# Patient Record
Sex: FEMALE | Race: WHITE | NOT HISPANIC OR LATINO | Employment: OTHER | URBAN - NONMETROPOLITAN AREA
[De-identification: names, ages, dates, MRNs, and addresses within clinical notes are randomized per-mention and may not be internally consistent; named-entity substitution may affect disease eponyms.]

---

## 2017-07-24 ENCOUNTER — HOSPITAL ENCOUNTER (EMERGENCY)
Facility: HOSPITAL | Age: 42
Discharge: HOME | End: 2017-07-24
Attending: EMERGENCY MEDICINE | Admitting: EMERGENCY MEDICINE

## 2017-07-24 ENCOUNTER — APPOINTMENT (OUTPATIENT)
Dept: GENERAL RADIOLOGY | Facility: HOSPITAL | Age: 42
End: 2017-07-24

## 2017-07-24 VITALS
SYSTOLIC BLOOD PRESSURE: 144 MMHG | TEMPERATURE: 98.7 F | OXYGEN SATURATION: 97 % | HEIGHT: 63 IN | BODY MASS INDEX: 49.08 KG/M2 | RESPIRATION RATE: 18 BRPM | DIASTOLIC BLOOD PRESSURE: 78 MMHG | WEIGHT: 277 LBS | HEART RATE: 74 BPM
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
## 2017-07-24 DIAGNOSIS — M79.642 PAIN OF LEFT HAND: ICD-10-CM

## 2017-07-24 DIAGNOSIS — M54.2 NECK PAIN: ICD-10-CM

## 2017-07-24 PROCEDURE — 72050 X-RAY EXAM NECK SPINE 4/5VWS: CPT | Performed by: RADIOLOGY

## 2017-07-24 PROCEDURE — 73130 X-RAY EXAM OF HAND: CPT | Performed by: RADIOLOGY

## 2017-07-24 PROCEDURE — 99283 EMERGENCY DEPT VISIT LOW MDM: CPT

## 2017-07-24 PROCEDURE — 25010000002 KETOROLAC TROMETHAMINE PER 15 MG: Performed by: NURSE PRACTITIONER

## 2017-07-24 PROCEDURE — 72040 X-RAY EXAM NECK SPINE 2-3 VW: CPT

## 2017-07-24 PROCEDURE — 73130 X-RAY EXAM OF HAND: CPT

## 2017-07-24 PROCEDURE — 96372 THER/PROPH/DIAG INJ SC/IM: CPT

## 2017-07-24 RX ORDER — CYCLOBENZAPRINE HCL 10 MG
10 TABLET ORAL ONCE
Status: COMPLETED | OUTPATIENT
Start: 2017-07-24 | End: 2017-07-24

## 2017-07-24 RX ORDER — CYCLOBENZAPRINE HCL 10 MG
10 TABLET ORAL 3 TIMES DAILY PRN
Qty: 12 TABLET | Refills: 0 | Status: SHIPPED | OUTPATIENT
Start: 2017-07-24 | End: 2018-09-17

## 2017-07-24 RX ORDER — KETOROLAC TROMETHAMINE 30 MG/ML
60 INJECTION, SOLUTION INTRAMUSCULAR; INTRAVENOUS ONCE
Status: COMPLETED | OUTPATIENT
Start: 2017-07-24 | End: 2017-07-24

## 2017-07-24 RX ADMIN — CYCLOBENZAPRINE HYDROCHLORIDE 10 MG: 10 TABLET, FILM COATED ORAL at 23:18

## 2017-07-24 RX ADMIN — KETOROLAC TROMETHAMINE 60 MG: 60 INJECTION, SOLUTION INTRAMUSCULAR at 23:18
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
## 2017-07-25 NOTE — DISCHARGE INSTRUCTIONS

## 2017-07-28 ENCOUNTER — TRANSCRIBE ORDERS (OUTPATIENT)
Dept: ADMINISTRATIVE | Facility: HOSPITAL | Age: 42
End: 2017-07-28

## 2017-07-28 DIAGNOSIS — M79.642 HAND PAIN, LEFT: Primary | ICD-10-CM

## 2017-07-29 ENCOUNTER — HOSPITAL ENCOUNTER (OUTPATIENT)
Dept: MRI IMAGING | Facility: HOSPITAL | Age: 42
Discharge: HOME | End: 2017-07-29
Admitting: NURSE PRACTITIONER

## 2017-07-29 DIAGNOSIS — M79.642 HAND PAIN, LEFT: ICD-10-CM

## 2017-07-29 PROCEDURE — 73218 MRI UPPER EXTREMITY W/O DYE: CPT | Performed by: RADIOLOGY

## 2017-07-29 PROCEDURE — 73218 MRI UPPER EXTREMITY W/O DYE: CPT

## 2018-03-25 ENCOUNTER — APPOINTMENT (OUTPATIENT)
Dept: GENERAL RADIOLOGY | Facility: HOSPITAL | Age: 43
End: 2018-03-25

## 2018-03-25 ENCOUNTER — HOSPITAL ENCOUNTER (EMERGENCY)
Facility: HOSPITAL | Age: 43
Discharge: HOME | End: 2018-03-25
Attending: EMERGENCY MEDICINE | Admitting: EMERGENCY MEDICINE

## 2018-03-25 VITALS
BODY MASS INDEX: 46.95 KG/M2 | HEART RATE: 98 BPM | HEIGHT: 64 IN | RESPIRATION RATE: 18 BRPM | OXYGEN SATURATION: 97 % | WEIGHT: 275 LBS | DIASTOLIC BLOOD PRESSURE: 92 MMHG | TEMPERATURE: 98 F | SYSTOLIC BLOOD PRESSURE: 134 MMHG

## 2018-03-25 DIAGNOSIS — S60.051A CONTUSION OF RIGHT LITTLE FINGER WITHOUT DAMAGE TO NAIL, INITIAL ENCOUNTER: Primary | ICD-10-CM

## 2018-03-25 PROCEDURE — 99283 EMERGENCY DEPT VISIT LOW MDM: CPT

## 2018-03-25 PROCEDURE — 73140 X-RAY EXAM OF FINGER(S): CPT | Performed by: RADIOLOGY

## 2018-03-25 PROCEDURE — 73140 X-RAY EXAM OF FINGER(S): CPT

## 2018-03-26 ENCOUNTER — EPISODE CHANGES (OUTPATIENT)
Dept: CASE MANAGEMENT | Facility: OTHER | Age: 43
End: 2018-03-26

## 2018-03-26 NOTE — ED NOTES
Patient reports she got her right pinky finger caught between two flower pots, reports she now has pain with movement. Provided ice pack for patient.      Isela Ascencio RN  03/25/18 2041

## 2018-03-26 NOTE — ED PROVIDER NOTES
Subjective     History provided by:  Patient  Upper Extremity Issue   Location:  Finger  Finger location:  R little finger  Injury: yes    Mechanism of injury: crush    Crush injury:     Mechanism:  Unable to specify  Pain details:     Quality:  Aching and throbbing    Radiates to:  Does not radiate    Severity:  Mild    Onset quality:  Gradual    Timing:  Constant    Progression:  Unchanged  Handedness:  Right-handed  Dislocation: no    Foreign body present:  No foreign bodies  Tetanus status:  Up to date  Prior injury to area:  No  Relieved by:  None tried  Worsened by:  Nothing  Ineffective treatments:  None tried  Associated symptoms: no fever        Review of Systems   Constitutional: Negative.  Negative for fever.   HENT: Negative.    Respiratory: Negative.    Cardiovascular: Negative.  Negative for chest pain.   Gastrointestinal: Negative.  Negative for abdominal pain.   Endocrine: Negative.    Genitourinary: Negative.  Negative for dysuria.   Skin: Negative.    Neurological: Negative.    Psychiatric/Behavioral: Negative.    All other systems reviewed and are negative.      History reviewed. No pertinent past medical history.    No Known Allergies    History reviewed. No pertinent surgical history.    History reviewed. No pertinent family history.    Social History     Social History   • Marital status:      Social History Main Topics   • Smoking status: Former Smoker   • Alcohol use No   • Drug use: No   • Sexual activity: Defer     Other Topics Concern   • Not on file           Objective   Physical Exam   Constitutional: She is oriented to person, place, and time. She appears well-developed and well-nourished. No distress.   HENT:   Head: Normocephalic and atraumatic.   Right Ear: External ear normal.   Left Ear: External ear normal.   Nose: Nose normal.   Eyes: Conjunctivae and EOM are normal. Pupils are equal, round, and reactive to light.   Neck: Normal range of motion. Neck supple. No JVD  present. No tracheal deviation present.   Cardiovascular: Normal rate, regular rhythm and normal heart sounds.    No murmur heard.  Pulmonary/Chest: Effort normal and breath sounds normal. No respiratory distress. She has no wheezes.   Abdominal: Soft. Bowel sounds are normal. There is no tenderness.   Musculoskeletal: Normal range of motion. She exhibits no edema or deformity.        Hands:  Neurological: She is alert and oriented to person, place, and time. No cranial nerve deficit.   Skin: Skin is warm and dry. No rash noted. She is not diaphoretic. No erythema. No pallor.   Psychiatric: She has a normal mood and affect. Her behavior is normal. Thought content normal.   Nursing note and vitals reviewed.      Procedures         ED Course  ED Course                  MDM  Number of Diagnoses or Management Options  Contusion of right little finger without damage to nail, initial encounter:      Amount and/or Complexity of Data Reviewed  Tests in the radiology section of CPT®: reviewed        Final diagnoses:   Contusion of right little finger without damage to nail, initial encounter            Rhonda Juárez, APRN  03/25/18 1010

## 2018-07-21 ENCOUNTER — HOSPITAL ENCOUNTER (EMERGENCY)
Facility: HOSPITAL | Age: 43
Discharge: HOME | End: 2018-07-21
Attending: EMERGENCY MEDICINE | Admitting: EMERGENCY MEDICINE

## 2018-07-21 ENCOUNTER — APPOINTMENT (OUTPATIENT)
Dept: GENERAL RADIOLOGY | Facility: HOSPITAL | Age: 43
End: 2018-07-21

## 2018-07-21 VITALS
BODY MASS INDEX: 46.44 KG/M2 | DIASTOLIC BLOOD PRESSURE: 81 MMHG | SYSTOLIC BLOOD PRESSURE: 170 MMHG | RESPIRATION RATE: 16 BRPM | OXYGEN SATURATION: 99 % | HEIGHT: 64 IN | WEIGHT: 272 LBS | HEART RATE: 110 BPM | TEMPERATURE: 97.1 F

## 2018-07-21 DIAGNOSIS — S93.491A HIGH ANKLE SPRAIN OF RIGHT LOWER EXTREMITY, INITIAL ENCOUNTER: Primary | ICD-10-CM

## 2018-07-21 PROCEDURE — 73630 X-RAY EXAM OF FOOT: CPT

## 2018-07-21 PROCEDURE — 73630 X-RAY EXAM OF FOOT: CPT | Performed by: RADIOLOGY

## 2018-07-21 PROCEDURE — 99284 EMERGENCY DEPT VISIT MOD MDM: CPT

## 2018-07-21 RX ORDER — TRAMADOL HYDROCHLORIDE 50 MG/1
50 TABLET ORAL EVERY 4 HOURS PRN
Qty: 12 TABLET | Refills: 0 | Status: SHIPPED | OUTPATIENT
Start: 2018-07-21 | End: 2018-09-17

## 2018-07-21 RX ORDER — HYDROCODONE BITARTRATE AND ACETAMINOPHEN 5; 325 MG/1; MG/1
1 TABLET ORAL ONCE
Status: COMPLETED | OUTPATIENT
Start: 2018-07-21 | End: 2018-07-21

## 2018-07-21 RX ADMIN — HYDROCODONE BITARTRATE AND ACETAMINOPHEN 1 TABLET: 5; 325 TABLET ORAL at 20:53

## 2018-09-17 ENCOUNTER — HOSPITAL ENCOUNTER (OUTPATIENT)
Dept: GENERAL RADIOLOGY | Facility: HOSPITAL | Age: 43
Discharge: HOME | End: 2018-09-17
Attending: ORTHOPAEDIC SURGERY | Admitting: ORTHOPAEDIC SURGERY

## 2018-09-17 ENCOUNTER — OFFICE VISIT (OUTPATIENT)
Dept: ORTHOPEDIC SURGERY | Facility: CLINIC | Age: 43
End: 2018-09-17

## 2018-09-17 VITALS — HEIGHT: 64 IN | WEIGHT: 286 LBS | BODY MASS INDEX: 48.83 KG/M2

## 2018-09-17 DIAGNOSIS — R20.0 FINGER NUMBNESS: ICD-10-CM

## 2018-09-17 DIAGNOSIS — M79.641 HAND PAIN, RIGHT: Primary | ICD-10-CM

## 2018-09-17 DIAGNOSIS — M79.644 PAIN IN FINGER OF RIGHT HAND: Primary | ICD-10-CM

## 2018-09-17 DIAGNOSIS — S69.91XA INJURY, FINGER, RIGHT, INITIAL ENCOUNTER: ICD-10-CM

## 2018-09-17 PROCEDURE — 99203 OFFICE O/P NEW LOW 30 MIN: CPT | Performed by: ORTHOPAEDIC SURGERY

## 2018-09-17 PROCEDURE — 73130 X-RAY EXAM OF HAND: CPT | Performed by: RADIOLOGY

## 2018-09-17 PROCEDURE — 73130 X-RAY EXAM OF HAND: CPT

## 2018-09-17 RX ORDER — GABAPENTIN 800 MG/1
800 TABLET ORAL 3 TIMES DAILY
COMMUNITY

## 2018-09-17 RX ORDER — HYDROCODONE/ACETAMINOPHEN 10MG-325MG
1 TABLET ORAL EVERY 8 HOURS
Status: ON HOLD | COMMUNITY
Start: 2018-08-01 | End: 2019-05-20

## 2018-09-17 RX ORDER — DICLOFENAC SODIUM 75 MG/1
1 TABLET, DELAYED RELEASE ORAL EVERY 12 HOURS
COMMUNITY
Start: 2018-02-28 | End: 2018-09-28 | Stop reason: ALTCHOICE

## 2018-09-17 RX ORDER — OMEPRAZOLE 20 MG/1
40 CAPSULE, DELAYED RELEASE ORAL
COMMUNITY
Start: 2015-02-24 | End: 2022-11-15 | Stop reason: HOSPADM

## 2018-09-17 RX ORDER — TIZANIDINE 2 MG/1
1 TABLET ORAL EVERY 8 HOURS
COMMUNITY
Start: 2018-08-01

## 2018-09-17 NOTE — PROGRESS NOTES
New Patient Visit      Patient: Madelyn Velazquez  YOB: 1975  Date of Encounter: 09/17/2018        Chief Complaint:   Chief Complaint   Patient presents with   • Right Hand - Pain       HPI:   Madelyn Velazquez, 42 y.o. female, referred by Hebert Butt MD presents today with pain in her right hand fifth finger.  She reports that on March 25, 2018 she was at [employer].  She reports a pot somehow fell trapping her fifth finger between that pot and another pot.  She reports continued pain since then.  She localizes pain along the radial border of the fifth finger.  She states she has difficulty bending the finger and straightening her finger.  She denies problems with her fifth finger prior to that injury.  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  She also has chronic pain lateral aspect of her left ankle after undergoing open reduction internal fixation of ankle fracture.    Active Problem List:  Patient Active Problem List   Diagnosis   • Pain in finger of right hand       Past Medical History:  Past Medical History:   Diagnosis Date   • Back pain    • GERD (gastroesophageal reflux disease)    • Osteoarthritis        Past Surgical History:  Past Surgical History:   Procedure Laterality Date   • CARPAL TUNNEL RELEASE Left    • HAND SURGERY Left    • LEG SURGERY Left        Family History:  Family History   Problem Relation Age of Onset   • Osteoarthritis Mother    • Heart disease Mother    • Hypertension Mother    • Osteoarthritis Father    • Hypertension Father    • Osteoarthritis Sister    • Rheum arthritis Sister    • Heart disease Sister    • Hypertension Sister    • Osteoarthritis Brother    • Heart disease Brother    • Hypertension Brother    • Osteoarthritis Maternal Grandmother    • Heart disease Maternal Grandmother    • Hypertension Maternal Grandmother    • Osteoarthritis Maternal Grandfather     • Heart disease Maternal Grandfather    • Hypertension Maternal Grandfather        Social History:  Social History     Social History   • Marital status:      Spouse name: N/A   • Number of children: N/A   • Years of education: N/A     Occupational History   • Not on file.     Social History Main Topics   • Smoking status: Former Smoker   • Smokeless tobacco: Never Used   • Alcohol use No   • Drug use: No   • Sexual activity: Defer     Other Topics Concern   • Not on file     Social History Narrative   • No narrative on file     Patient's Body mass index is 49.09 kg/m². BMI is above normal parameters. Recommendations include: educational material.      Medications:  Current Outpatient Prescriptions   Medication Sig Dispense Refill   • diclofenac (VOLTAREN) 75 MG EC tablet Take 1 tablet by mouth Every 12 (Twelve) Hours.     • gabapentin (NEURONTIN) 800 MG tablet Take 800 mg by mouth 3 (Three) Times a Day.     • HYDROcodone-acetaminophen (NORCO)  MG per tablet Take 1 tablet by mouth Every 8 (Eight) Hours.     • omeprazole (PRILOSEC) 20 MG capsule Take 40 mg by mouth.     • tiZANidine (ZANAFLEX) 2 MG tablet Take 1 tablet by mouth Every 8 (Eight) Hours.       No current facility-administered medications for this visit.        Allergies:  No Known Allergies    Review of Systems:   Review of Systems   Constitutional: Negative.    HENT: Positive for ear discharge, sinus pain and sinus pressure.    Eyes: Negative.    Respiratory: Negative.    Cardiovascular: Positive for leg swelling.   Gastrointestinal: Negative.    Endocrine: Positive for cold intolerance and heat intolerance.   Genitourinary: Negative.    Musculoskeletal: Positive for arthralgias, back pain, joint swelling and myalgias.   Skin: Negative.    Allergic/Immunologic: Negative.    Neurological: Positive for numbness.   Hematological: Negative.    Psychiatric/Behavioral: Negative.        Physical Exam:   Physical Exam  GENERAL: 42 y.o. female,  "alert and oriented X 3 in no acute distress.   Visit Vitals  Ht 162.6 cm (64\")   Wt 130 kg (286 lb)   BMI 49.09 kg/m²     Musculoskeletal: Right hand evaluation reveals no gross deformity of the fifth finger.  It has normal contour it demonstrates full extension with flexion of the PIP joint to 80°.  There is no palpable nodule or defect of the finger.  The tenderness is localized to the radial volar aspect of the proximal finger.  When compared to the left fifth finger there is no significant difference.  She does exhibit decreased sensation along the radial border of the fifth finger.    Radiology/Labs:   Radiographs reviewed right hand fifth finger show no evidence of acute fracture or lesion.      Assessment & Plan:   42 y.o. female with complaints of pain right hand fifth finger with clinical exam demonstrating some decreased sensation along the radial border of the fifth finger.  It may be possible that she has sustained neuropraxia digital nerve radial side fifth finger.  At this point I do not think anything can or should be done for the fifth finger.  She requested injection but I am reluctant to provide given the proximity of her complaints to the digital nerve.  She will follow-up in clinic in 3 months time.  She was offered referral to hand specialist but declined.      ICD-10-CM ICD-9-CM   1. Pain in finger of right hand M79.644 729.5   2. Finger numbness Right Fifth R20.0 782.0   3. Injury, finger, right, initial encounter S69.91XA 959.5               Cc:   Hebert Butt MD          Scribed for Gerson Andres MD by Linda Andres RN.10:59 AM 09/17/2018      "

## 2018-09-28 ENCOUNTER — APPOINTMENT (OUTPATIENT)
Dept: GENERAL RADIOLOGY | Facility: HOSPITAL | Age: 43
End: 2018-09-28

## 2018-09-28 ENCOUNTER — HOSPITAL ENCOUNTER (EMERGENCY)
Facility: HOSPITAL | Age: 43
Discharge: HOME | End: 2018-09-28
Attending: INTERNAL MEDICINE | Admitting: INTERNAL MEDICINE

## 2018-09-28 VITALS
SYSTOLIC BLOOD PRESSURE: 127 MMHG | TEMPERATURE: 97.8 F | BODY MASS INDEX: 47.46 KG/M2 | HEART RATE: 90 BPM | WEIGHT: 278 LBS | RESPIRATION RATE: 18 BRPM | DIASTOLIC BLOOD PRESSURE: 79 MMHG | OXYGEN SATURATION: 98 % | HEIGHT: 64 IN

## 2018-09-28 DIAGNOSIS — M10.9 ACUTE GOUT OF LEFT HAND, UNSPECIFIED CAUSE: Primary | ICD-10-CM

## 2018-09-28 LAB
ALBUMIN SERPL-MCNC: 4.3 G/DL (ref 3.5–5)
ALBUMIN/GLOB SERPL: 1.2 G/DL (ref 1.5–2.5)
ALP SERPL-CCNC: 79 U/L (ref 35–104)
ALT SERPL W P-5'-P-CCNC: 34 U/L (ref 10–36)
ANION GAP SERPL CALCULATED.3IONS-SCNC: 7 MMOL/L (ref 3.6–11.2)
AST SERPL-CCNC: 35 U/L (ref 10–30)
BASOPHILS # BLD AUTO: 0.04 10*3/MM3 (ref 0–0.3)
BASOPHILS NFR BLD AUTO: 0.5 % (ref 0–2)
BILIRUB SERPL-MCNC: 0.4 MG/DL (ref 0.2–1.8)
BUN BLD-MCNC: 9 MG/DL (ref 7–21)
BUN/CREAT SERPL: 10.5 (ref 7–25)
CALCIUM SPEC-SCNC: 9.2 MG/DL (ref 7.7–10)
CHLORIDE SERPL-SCNC: 105 MMOL/L (ref 99–112)
CO2 SERPL-SCNC: 22 MMOL/L (ref 24.3–31.9)
CREAT BLD-MCNC: 0.86 MG/DL (ref 0.43–1.29)
CRP SERPL-MCNC: 4.37 MG/DL (ref 0–0.99)
D-LACTATE SERPL-SCNC: 1.2 MMOL/L (ref 0.5–2)
DEPRECATED RDW RBC AUTO: 44 FL (ref 37–54)
EOSINOPHIL # BLD AUTO: 0.08 10*3/MM3 (ref 0–0.7)
EOSINOPHIL NFR BLD AUTO: 0.9 % (ref 0–5)
ERYTHROCYTE [DISTWIDTH] IN BLOOD BY AUTOMATED COUNT: 13.4 % (ref 11.5–14.5)
ERYTHROCYTE [SEDIMENTATION RATE] IN BLOOD: 23 MM/HR (ref 0–20)
GFR SERPL CREATININE-BSD FRML MDRD: 72 ML/MIN/1.73
GLOBULIN UR ELPH-MCNC: 3.5 GM/DL
GLUCOSE BLD-MCNC: 116 MG/DL (ref 70–110)
HCT VFR BLD AUTO: 45.2 % (ref 37–47)
HGB BLD-MCNC: 14.9 G/DL (ref 12–16)
IMM GRANULOCYTES # BLD: 0.02 10*3/MM3 (ref 0–0.03)
IMM GRANULOCYTES NFR BLD: 0.2 % (ref 0–0.5)
LYMPHOCYTES # BLD AUTO: 1.73 10*3/MM3 (ref 1–3)
LYMPHOCYTES NFR BLD AUTO: 19.5 % (ref 21–51)
MCH RBC QN AUTO: 30.6 PG (ref 27–33)
MCHC RBC AUTO-ENTMCNC: 33 G/DL (ref 33–37)
MCV RBC AUTO: 92.8 FL (ref 80–94)
MONOCYTES # BLD AUTO: 0.63 10*3/MM3 (ref 0.1–0.9)
MONOCYTES NFR BLD AUTO: 7.1 % (ref 0–10)
NEUTROPHILS # BLD AUTO: 6.37 10*3/MM3 (ref 1.4–6.5)
NEUTROPHILS NFR BLD AUTO: 71.8 % (ref 30–70)
OSMOLALITY SERPL CALC.SUM OF ELEC: 267.9 MOSM/KG (ref 273–305)
PLATELET # BLD AUTO: 288 10*3/MM3 (ref 130–400)
PMV BLD AUTO: 11.5 FL (ref 6–10)
POTASSIUM BLD-SCNC: 4.7 MMOL/L (ref 3.5–5.3)
PROT SERPL-MCNC: 7.8 G/DL (ref 6–8)
RBC # BLD AUTO: 4.87 10*6/MM3 (ref 4.2–5.4)
SODIUM BLD-SCNC: 134 MMOL/L (ref 135–153)
URATE SERPL-MCNC: 6.1 MG/DL (ref 2.4–5.7)
WBC NRBC COR # BLD: 8.87 10*3/MM3 (ref 4.5–12.5)

## 2018-09-28 PROCEDURE — 25010000002 DEXAMETHASONE PER 1 MG: Performed by: PHYSICIAN ASSISTANT

## 2018-09-28 PROCEDURE — 73090 X-RAY EXAM OF FOREARM: CPT

## 2018-09-28 PROCEDURE — 73090 X-RAY EXAM OF FOREARM: CPT | Performed by: RADIOLOGY

## 2018-09-28 PROCEDURE — 85652 RBC SED RATE AUTOMATED: CPT | Performed by: PHYSICIAN ASSISTANT

## 2018-09-28 PROCEDURE — 80053 COMPREHEN METABOLIC PANEL: CPT | Performed by: PHYSICIAN ASSISTANT

## 2018-09-28 PROCEDURE — 87040 BLOOD CULTURE FOR BACTERIA: CPT | Performed by: PHYSICIAN ASSISTANT

## 2018-09-28 PROCEDURE — 99283 EMERGENCY DEPT VISIT LOW MDM: CPT

## 2018-09-28 PROCEDURE — 96372 THER/PROPH/DIAG INJ SC/IM: CPT

## 2018-09-28 PROCEDURE — 83605 ASSAY OF LACTIC ACID: CPT | Performed by: PHYSICIAN ASSISTANT

## 2018-09-28 PROCEDURE — 85025 COMPLETE CBC W/AUTO DIFF WBC: CPT | Performed by: PHYSICIAN ASSISTANT

## 2018-09-28 PROCEDURE — 84550 ASSAY OF BLOOD/URIC ACID: CPT | Performed by: PHYSICIAN ASSISTANT

## 2018-09-28 PROCEDURE — 86140 C-REACTIVE PROTEIN: CPT | Performed by: PHYSICIAN ASSISTANT

## 2018-09-28 PROCEDURE — 25010000002 KETOROLAC TROMETHAMINE PER 15 MG: Performed by: PHYSICIAN ASSISTANT

## 2018-09-28 PROCEDURE — 73130 X-RAY EXAM OF HAND: CPT | Performed by: RADIOLOGY

## 2018-09-28 PROCEDURE — 73130 X-RAY EXAM OF HAND: CPT

## 2018-09-28 RX ORDER — METHYLPREDNISOLONE 4 MG/1
TABLET ORAL
Qty: 21 TABLET | Refills: 0 | Status: SHIPPED | OUTPATIENT
Start: 2018-09-28 | End: 2018-12-17

## 2018-09-28 RX ORDER — OXYCODONE HYDROCHLORIDE AND ACETAMINOPHEN 5; 325 MG/1; MG/1
1 TABLET ORAL ONCE
Status: COMPLETED | OUTPATIENT
Start: 2018-09-28 | End: 2018-09-28

## 2018-09-28 RX ORDER — KETOROLAC TROMETHAMINE 30 MG/ML
60 INJECTION, SOLUTION INTRAMUSCULAR; INTRAVENOUS ONCE
Status: COMPLETED | OUTPATIENT
Start: 2018-09-28 | End: 2018-09-28

## 2018-09-28 RX ORDER — DEXAMETHASONE SODIUM PHOSPHATE 4 MG/ML
10 INJECTION, SOLUTION INTRA-ARTICULAR; INTRALESIONAL; INTRAMUSCULAR; INTRAVENOUS; SOFT TISSUE ONCE
Status: COMPLETED | OUTPATIENT
Start: 2018-09-28 | End: 2018-09-28

## 2018-09-28 RX ORDER — INDOMETHACIN 75 MG/1
75 CAPSULE, EXTENDED RELEASE ORAL 2 TIMES DAILY WITH MEALS
Qty: 14 CAPSULE | Refills: 0 | Status: SHIPPED | OUTPATIENT
Start: 2018-09-28 | End: 2018-10-05

## 2018-09-28 RX ADMIN — DEXAMETHASONE SODIUM PHOSPHATE 10 MG: 4 INJECTION, SOLUTION INTRAMUSCULAR; INTRAVENOUS at 17:28

## 2018-09-28 RX ADMIN — KETOROLAC TROMETHAMINE 60 MG: 60 INJECTION, SOLUTION INTRAMUSCULAR at 17:28

## 2018-09-28 RX ADMIN — OXYCODONE HYDROCHLORIDE AND ACETAMINOPHEN 1 TABLET: 5; 325 TABLET ORAL at 15:42

## 2018-10-02 ENCOUNTER — EPISODE CHANGES (OUTPATIENT)
Dept: CASE MANAGEMENT | Facility: OTHER | Age: 43
End: 2018-10-02

## 2018-10-03 LAB
BACTERIA SPEC AEROBE CULT: NORMAL
BACTERIA SPEC AEROBE CULT: NORMAL

## 2018-11-28 ENCOUNTER — HOSPITAL ENCOUNTER (OUTPATIENT)
Dept: RESPIRATORY THERAPY | Facility: HOSPITAL | Age: 43
Discharge: HOME | End: 2018-11-28
Attending: FAMILY MEDICINE | Admitting: FAMILY MEDICINE

## 2018-11-28 ENCOUNTER — TRANSCRIBE ORDERS (OUTPATIENT)
Dept: ADMINISTRATIVE | Facility: HOSPITAL | Age: 43
End: 2018-11-28

## 2018-11-28 DIAGNOSIS — R00.2 PALPITATION: Primary | ICD-10-CM

## 2018-11-28 DIAGNOSIS — R00.2 PALPITATION: ICD-10-CM

## 2018-11-28 PROCEDURE — 93225 XTRNL ECG REC<48 HRS REC: CPT

## 2018-11-28 PROCEDURE — 93226 XTRNL ECG REC<48 HR SCAN A/R: CPT

## 2018-12-17 ENCOUNTER — OFFICE VISIT (OUTPATIENT)
Dept: ORTHOPEDIC SURGERY | Facility: CLINIC | Age: 43
End: 2018-12-17

## 2018-12-17 VITALS — WEIGHT: 282 LBS | HEIGHT: 64 IN | BODY MASS INDEX: 48.14 KG/M2

## 2018-12-17 DIAGNOSIS — M79.641 HAND PAIN, RIGHT: Primary | ICD-10-CM

## 2018-12-17 PROCEDURE — 99213 OFFICE O/P EST LOW 20 MIN: CPT | Performed by: ORTHOPAEDIC SURGERY

## 2018-12-17 RX ORDER — ALLOPURINOL 100 MG/1
1 TABLET ORAL DAILY
COMMUNITY
Start: 2018-10-31

## 2018-12-17 RX ORDER — COLCHICINE 0.6 MG/1
1 TABLET ORAL EVERY 12 HOURS
Status: ON HOLD | COMMUNITY
Start: 2018-10-31 | End: 2019-01-30

## 2018-12-17 NOTE — PROGRESS NOTES
Follow-up Visit         Patient: Madelyn Velazquez  YOB: 1975  Date of Encounter: 12/17/2018      Chief  Complaint:   Chief Complaint   Patient presents with   • Right Hand - Pain, Edema, Follow-up         HPI:  Madelyn Velazquez, 43 y.o. female returns in follow-up right hand, pain related to an injury in March of this year when a pot fell on her right hand.  She reports continued pain in the fifth finger.  She reports that her pain is excruciating and intolerable.  She describes numbness involving her fifth finger.  She reports no improvement since her last visit and actually reports that her pain is worse.  She reports that she cannot bend her fifth finger nor can she straighten her fifth finger.    Medical History:  Patient Active Problem List   Diagnosis   • Pain in finger of right hand     Past Medical History:   Diagnosis Date   • Back pain    • GERD (gastroesophageal reflux disease)    • Gout    • Osteoarthritis        Social History:  Social History     Socioeconomic History   • Marital status:      Spouse name: Not on file   • Number of children: Not on file   • Years of education: Not on file   • Highest education level: Not on file   Social Needs   • Financial resource strain: Not on file   • Food insecurity - worry: Not on file   • Food insecurity - inability: Not on file   • Transportation needs - medical: Not on file   • Transportation needs - non-medical: Not on file   Occupational History   • Not on file   Tobacco Use   • Smoking status: Former Smoker   • Smokeless tobacco: Never Used   Substance and Sexual Activity   • Alcohol use: No   • Drug use: No   • Sexual activity: Defer   Other Topics Concern   • Not on file   Social History Narrative   • Not on file       Surgical History:  Past Surgical History:   Procedure Laterality Date   • CARPAL TUNNEL RELEASE Left    • HAND SURGERY Left    • LEG SURGERY Left          Examination:   Right hand evaluation reveals no swelling no  deformity.  I can actively extend her fifth finger to full and flex her fifth finger into a fisted position.  She has greatest tenderness over the radial side of the fifth finger.  No palpable nodules.  She reports decreased sensation diffusely about the fifth finger.      Assessment & Plan:   43 y.o. female with complaints of right hand fifth finger pain and difficulty with use with numbness in the fifth finger and inability to flex or extend.  Previous x-rays of right hand obtained were normal by report and by my review.  I have no explanation for her discomfort.  She will be referred for evaluation by hand specialists.         Diagnosis Plan   1. Hand pain, right               Cc:  Hebert Butt MD      Scribed for Gerson Andres MD by Linda Andres RN.8:54 AM 12/17/2018

## 2018-12-30 ENCOUNTER — APPOINTMENT (OUTPATIENT)
Dept: MRI IMAGING | Facility: HOSPITAL | Age: 43
End: 2018-12-30

## 2018-12-30 ENCOUNTER — HOSPITAL ENCOUNTER (INPATIENT)
Facility: HOSPITAL | Age: 43
LOS: 2 days | Discharge: HOME | End: 2019-01-01
Attending: INTERNAL MEDICINE | Admitting: HOSPITALIST

## 2018-12-30 ENCOUNTER — HOSPITAL ENCOUNTER (EMERGENCY)
Facility: HOSPITAL | Age: 43
Discharge: ANOTHER HOSPITAL | End: 2018-12-30
Attending: EMERGENCY MEDICINE | Admitting: EMERGENCY MEDICINE

## 2018-12-30 ENCOUNTER — APPOINTMENT (OUTPATIENT)
Dept: CT IMAGING | Facility: HOSPITAL | Age: 43
End: 2018-12-30

## 2018-12-30 VITALS
DIASTOLIC BLOOD PRESSURE: 48 MMHG | TEMPERATURE: 98.4 F | OXYGEN SATURATION: 100 % | BODY MASS INDEX: 49.17 KG/M2 | HEIGHT: 64 IN | SYSTOLIC BLOOD PRESSURE: 110 MMHG | HEART RATE: 85 BPM | WEIGHT: 288 LBS | RESPIRATION RATE: 20 BRPM

## 2018-12-30 DIAGNOSIS — Z74.09 IMPAIRED MOBILITY AND ADLS: Primary | ICD-10-CM

## 2018-12-30 DIAGNOSIS — I48.91 ATRIAL FIBRILLATION WITH RVR (HCC): Primary | ICD-10-CM

## 2018-12-30 DIAGNOSIS — I63.9 ISCHEMIC STROKE (HCC): ICD-10-CM

## 2018-12-30 DIAGNOSIS — Z78.9 IMPAIRED MOBILITY AND ADLS: Primary | ICD-10-CM

## 2018-12-30 DIAGNOSIS — I63.9 CEREBROVASCULAR ACCIDENT (CVA), UNSPECIFIED MECHANISM (HCC): ICD-10-CM

## 2018-12-30 DIAGNOSIS — R47.1 DYSARTHRIA: ICD-10-CM

## 2018-12-30 DIAGNOSIS — Z74.09 IMPAIRED FUNCTIONAL MOBILITY, BALANCE, GAIT, AND ENDURANCE: ICD-10-CM

## 2018-12-30 PROCEDURE — 93005 ELECTROCARDIOGRAM TRACING: CPT | Performed by: EMERGENCY MEDICINE

## 2018-12-30 PROCEDURE — 25010000002 DIGOXIN PER 500 MCG: Performed by: EMERGENCY MEDICINE

## 2018-12-30 PROCEDURE — 96365 THER/PROPH/DIAG IV INF INIT: CPT

## 2018-12-30 PROCEDURE — 96376 TX/PRO/DX INJ SAME DRUG ADON: CPT

## 2018-12-30 PROCEDURE — 96375 TX/PRO/DX INJ NEW DRUG ADDON: CPT

## 2018-12-30 PROCEDURE — 70450 CT HEAD/BRAIN W/O DYE: CPT | Performed by: RADIOLOGY

## 2018-12-30 PROCEDURE — 70450 CT HEAD/BRAIN W/O DYE: CPT

## 2018-12-30 PROCEDURE — 93010 ELECTROCARDIOGRAM REPORT: CPT | Performed by: INTERNAL MEDICINE

## 2018-12-30 PROCEDURE — 70553 MRI BRAIN STEM W/O & W/DYE: CPT

## 2018-12-30 PROCEDURE — 99284 EMERGENCY DEPT VISIT MOD MDM: CPT

## 2018-12-30 PROCEDURE — 25010000002 KETOROLAC TROMETHAMINE PER 15 MG: Performed by: INTERNAL MEDICINE

## 2018-12-30 PROCEDURE — 99223 1ST HOSP IP/OBS HIGH 75: CPT | Performed by: INTERNAL MEDICINE

## 2018-12-30 PROCEDURE — 25010000002 ONDANSETRON PER 1 MG: Performed by: INTERNAL MEDICINE

## 2018-12-30 RX ORDER — SODIUM CHLORIDE 0.9 % (FLUSH) 0.9 %
3-10 SYRINGE (ML) INJECTION AS NEEDED
Status: DISCONTINUED | OUTPATIENT
Start: 2018-12-30 | End: 2019-01-01 | Stop reason: HOSPADM

## 2018-12-30 RX ORDER — ASPIRIN 81 MG/1
81 TABLET, CHEWABLE ORAL DAILY
Status: DISCONTINUED | OUTPATIENT
Start: 2018-12-30 | End: 2019-01-01 | Stop reason: HOSPADM

## 2018-12-30 RX ORDER — PANTOPRAZOLE SODIUM 40 MG/1
40 TABLET, DELAYED RELEASE ORAL EVERY MORNING
Status: DISCONTINUED | OUTPATIENT
Start: 2018-12-31 | End: 2019-01-01 | Stop reason: HOSPADM

## 2018-12-30 RX ORDER — KETOROLAC TROMETHAMINE 30 MG/ML
30 INJECTION, SOLUTION INTRAMUSCULAR; INTRAVENOUS ONCE AS NEEDED
Status: COMPLETED | OUTPATIENT
Start: 2018-12-30 | End: 2018-12-30

## 2018-12-30 RX ORDER — ATORVASTATIN CALCIUM 40 MG/1
80 TABLET, FILM COATED ORAL NIGHTLY
Status: DISCONTINUED | OUTPATIENT
Start: 2018-12-30 | End: 2019-01-01 | Stop reason: HOSPADM

## 2018-12-30 RX ORDER — ONDANSETRON 2 MG/ML
4 INJECTION INTRAMUSCULAR; INTRAVENOUS EVERY 6 HOURS PRN
Status: DISCONTINUED | OUTPATIENT
Start: 2018-12-30 | End: 2019-01-01 | Stop reason: HOSPADM

## 2018-12-30 RX ORDER — DIGOXIN 0.25 MG/ML
500 INJECTION INTRAMUSCULAR; INTRAVENOUS ONCE
Status: COMPLETED | OUTPATIENT
Start: 2018-12-30 | End: 2018-12-30

## 2018-12-30 RX ORDER — SODIUM CHLORIDE 0.9 % (FLUSH) 0.9 %
3 SYRINGE (ML) INJECTION EVERY 12 HOURS SCHEDULED
Status: DISCONTINUED | OUTPATIENT
Start: 2018-12-30 | End: 2019-01-01 | Stop reason: HOSPADM

## 2018-12-30 RX ORDER — ASPIRIN 300 MG/1
300 SUPPOSITORY RECTAL DAILY
Status: DISCONTINUED | OUTPATIENT
Start: 2018-12-30 | End: 2019-01-01 | Stop reason: HOSPADM

## 2018-12-30 RX ADMIN — SODIUM CHLORIDE, PRESERVATIVE FREE 3 ML: 5 INJECTION INTRAVENOUS at 20:15

## 2018-12-30 RX ADMIN — KETOROLAC TROMETHAMINE 30 MG: 30 INJECTION, SOLUTION INTRAMUSCULAR at 19:17

## 2018-12-30 RX ADMIN — ONDANSETRON 4 MG: 2 INJECTION INTRAMUSCULAR; INTRAVENOUS at 19:17

## 2018-12-30 RX ADMIN — DIGOXIN 500 MCG: 0.25 INJECTION INTRAMUSCULAR; INTRAVENOUS at 14:07

## 2018-12-30 RX ADMIN — ASPIRIN 300 MG: 300 SUPPOSITORY RECTAL at 20:15

## 2018-12-30 RX ADMIN — DILTIAZEM HYDROCHLORIDE 5 MG/HR: 5 INJECTION INTRAVENOUS at 14:23

## 2018-12-31 ENCOUNTER — APPOINTMENT (OUTPATIENT)
Dept: CARDIOLOGY | Facility: HOSPITAL | Age: 43
End: 2018-12-31
Attending: INTERNAL MEDICINE

## 2018-12-31 ENCOUNTER — APPOINTMENT (OUTPATIENT)
Dept: CT IMAGING | Facility: HOSPITAL | Age: 43
End: 2018-12-31

## 2018-12-31 LAB
ANION GAP SERPL CALCULATED.3IONS-SCNC: 6 MMOL/L (ref 3–11)
ARTICHOKE IGE QN: 75 MG/DL (ref 0–130)
BASOPHILS # BLD AUTO: 0.02 10*3/MM3 (ref 0–0.2)
BASOPHILS NFR BLD AUTO: 0.2 % (ref 0–1)
BUN BLD-MCNC: 14 MG/DL (ref 9–23)
BUN/CREAT SERPL: 15.4 (ref 7–25)
CALCIUM SPEC-SCNC: 8.9 MG/DL (ref 8.7–10.4)
CHLORIDE SERPL-SCNC: 106 MMOL/L (ref 99–109)
CHOLEST SERPL-MCNC: 136 MG/DL (ref 0–200)
CO2 SERPL-SCNC: 23 MMOL/L (ref 20–31)
CREAT BLD-MCNC: 0.91 MG/DL (ref 0.6–1.3)
DEPRECATED RDW RBC AUTO: 46.2 FL (ref 37–54)
EOSINOPHIL # BLD AUTO: 0.04 10*3/MM3 (ref 0–0.3)
EOSINOPHIL NFR BLD AUTO: 0.5 % (ref 0–3)
ERYTHROCYTE [DISTWIDTH] IN BLOOD BY AUTOMATED COUNT: 13.6 % (ref 11.3–14.5)
GFR SERPL CREATININE-BSD FRML MDRD: 67 ML/MIN/1.73
GLUCOSE BLD-MCNC: 100 MG/DL (ref 70–100)
GLUCOSE BLDC GLUCOMTR-MCNC: 100 MG/DL (ref 70–130)
GLUCOSE BLDC GLUCOMTR-MCNC: 124 MG/DL (ref 70–130)
HBA1C MFR BLD: 6.6 % (ref 4.8–5.6)
HCT VFR BLD AUTO: 42.4 % (ref 34.5–44)
HDLC SERPL-MCNC: 40 MG/DL (ref 40–60)
HGB BLD-MCNC: 13.3 G/DL (ref 11.5–15.5)
IMM GRANULOCYTES # BLD AUTO: 0.01 10*3/MM3 (ref 0–0.03)
IMM GRANULOCYTES NFR BLD AUTO: 0.1 % (ref 0–0.6)
LYMPHOCYTES # BLD AUTO: 1.89 10*3/MM3 (ref 0.6–4.8)
LYMPHOCYTES NFR BLD AUTO: 21.5 % (ref 24–44)
MCH RBC QN AUTO: 29.3 PG (ref 27–31)
MCHC RBC AUTO-ENTMCNC: 31.4 G/DL (ref 32–36)
MCV RBC AUTO: 93.4 FL (ref 80–99)
MONOCYTES # BLD AUTO: 1.07 10*3/MM3 (ref 0–1)
MONOCYTES NFR BLD AUTO: 12.2 % (ref 0–12)
NEUTROPHILS # BLD AUTO: 5.78 10*3/MM3 (ref 1.5–8.3)
NEUTROPHILS NFR BLD AUTO: 65.6 % (ref 41–71)
PLATELET # BLD AUTO: 267 10*3/MM3 (ref 150–450)
PMV BLD AUTO: 11 FL (ref 6–12)
POTASSIUM BLD-SCNC: 4.3 MMOL/L (ref 3.5–5.5)
RBC # BLD AUTO: 4.54 10*6/MM3 (ref 3.89–5.14)
SODIUM BLD-SCNC: 135 MMOL/L (ref 132–146)
TRIGL SERPL-MCNC: 168 MG/DL (ref 0–150)
WBC NRBC COR # BLD: 8.8 10*3/MM3 (ref 3.5–10.8)

## 2018-12-31 PROCEDURE — 97166 OT EVAL MOD COMPLEX 45 MIN: CPT | Performed by: OCCUPATIONAL THERAPIST

## 2018-12-31 PROCEDURE — 0 IOPAMIDOL PER 1 ML: Performed by: HOSPITALIST

## 2018-12-31 PROCEDURE — 92610 EVALUATE SWALLOWING FUNCTION: CPT

## 2018-12-31 PROCEDURE — 83036 HEMOGLOBIN GLYCOSYLATED A1C: CPT | Performed by: INTERNAL MEDICINE

## 2018-12-31 PROCEDURE — 70498 CT ANGIOGRAPHY NECK: CPT

## 2018-12-31 PROCEDURE — 82962 GLUCOSE BLOOD TEST: CPT

## 2018-12-31 PROCEDURE — 99223 1ST HOSP IP/OBS HIGH 75: CPT | Performed by: PSYCHIATRY & NEUROLOGY

## 2018-12-31 PROCEDURE — 70496 CT ANGIOGRAPHY HEAD: CPT

## 2018-12-31 PROCEDURE — 85025 COMPLETE CBC W/AUTO DIFF WBC: CPT | Performed by: INTERNAL MEDICINE

## 2018-12-31 PROCEDURE — 80048 BASIC METABOLIC PNL TOTAL CA: CPT | Performed by: INTERNAL MEDICINE

## 2018-12-31 PROCEDURE — 0 GADOBENATE DIMEGLUMINE 529 MG/ML SOLUTION: Performed by: INTERNAL MEDICINE

## 2018-12-31 PROCEDURE — A9577 INJ MULTIHANCE: HCPCS | Performed by: INTERNAL MEDICINE

## 2018-12-31 PROCEDURE — 80061 LIPID PANEL: CPT | Performed by: INTERNAL MEDICINE

## 2018-12-31 PROCEDURE — 99233 SBSQ HOSP IP/OBS HIGH 50: CPT | Performed by: HOSPITALIST

## 2018-12-31 PROCEDURE — 93306 TTE W/DOPPLER COMPLETE: CPT

## 2018-12-31 PROCEDURE — 92523 SPEECH SOUND LANG COMPREHEN: CPT

## 2018-12-31 PROCEDURE — 97161 PT EVAL LOW COMPLEX 20 MIN: CPT

## 2018-12-31 RX ORDER — HYDROCODONE BITARTRATE AND ACETAMINOPHEN 10; 325 MG/1; MG/1
1 TABLET ORAL EVERY 6 HOURS PRN
Status: DISCONTINUED | OUTPATIENT
Start: 2018-12-31 | End: 2019-01-01 | Stop reason: HOSPADM

## 2018-12-31 RX ORDER — GABAPENTIN 400 MG/1
800 CAPSULE ORAL 3 TIMES DAILY
Status: DISCONTINUED | OUTPATIENT
Start: 2018-12-31 | End: 2019-01-01 | Stop reason: HOSPADM

## 2018-12-31 RX ORDER — DILTIAZEM HYDROCHLORIDE 180 MG/1
180 CAPSULE, COATED, EXTENDED RELEASE ORAL
Status: DISCONTINUED | OUTPATIENT
Start: 2018-12-31 | End: 2019-01-01 | Stop reason: HOSPADM

## 2018-12-31 RX ORDER — ALLOPURINOL 100 MG/1
100 TABLET ORAL DAILY
Status: DISCONTINUED | OUTPATIENT
Start: 2018-12-31 | End: 2019-01-01 | Stop reason: HOSPADM

## 2018-12-31 RX ADMIN — HYDROCODONE BITARTRATE AND ACETAMINOPHEN 1 TABLET: 10; 325 TABLET ORAL at 21:31

## 2018-12-31 RX ADMIN — APIXABAN 5 MG: 5 TABLET, FILM COATED ORAL at 13:16

## 2018-12-31 RX ADMIN — GADOBENATE DIMEGLUMINE 20 ML: 529 INJECTION, SOLUTION INTRAVENOUS at 01:15

## 2018-12-31 RX ADMIN — ATORVASTATIN CALCIUM 80 MG: 40 TABLET, FILM COATED ORAL at 21:31

## 2018-12-31 RX ADMIN — DILTIAZEM HYDROCHLORIDE 180 MG: 180 CAPSULE, COATED, EXTENDED RELEASE ORAL at 12:31

## 2018-12-31 RX ADMIN — IOPAMIDOL 95 ML: 755 INJECTION, SOLUTION INTRAVENOUS at 15:50

## 2018-12-31 RX ADMIN — ALLOPURINOL 100 MG: 100 TABLET ORAL at 12:22

## 2018-12-31 RX ADMIN — PANTOPRAZOLE SODIUM 40 MG: 40 TABLET, DELAYED RELEASE ORAL at 11:32

## 2018-12-31 RX ADMIN — GABAPENTIN 800 MG: 400 CAPSULE ORAL at 12:23

## 2018-12-31 RX ADMIN — HYDROCODONE BITARTRATE AND ACETAMINOPHEN 1 TABLET: 10; 325 TABLET ORAL at 12:23

## 2018-12-31 RX ADMIN — APIXABAN 5 MG: 5 TABLET, FILM COATED ORAL at 21:31

## 2018-12-31 RX ADMIN — GABAPENTIN 800 MG: 400 CAPSULE ORAL at 21:32

## 2018-12-31 RX ADMIN — ASPIRIN 81 MG 81 MG: 81 TABLET ORAL at 11:33

## 2019-01-01 VITALS
DIASTOLIC BLOOD PRESSURE: 93 MMHG | HEIGHT: 64 IN | BODY MASS INDEX: 49.17 KG/M2 | WEIGHT: 288 LBS | TEMPERATURE: 98.2 F | HEART RATE: 75 BPM | OXYGEN SATURATION: 98 % | SYSTOLIC BLOOD PRESSURE: 143 MMHG | RESPIRATION RATE: 16 BRPM

## 2019-01-01 PROBLEM — I63.9 ISCHEMIC STROKE: Status: ACTIVE | Noted: 2019-01-01

## 2019-01-01 PROBLEM — M19.90 OSTEOARTHRITIS: Status: ACTIVE | Noted: 2019-01-01

## 2019-01-01 PROBLEM — R73.09 HEMOGLOBIN A1C LESS THAN 7.0%: Status: ACTIVE | Noted: 2019-01-01

## 2019-01-01 PROBLEM — I48.0 PAROXYSMAL ATRIAL FIBRILLATION (HCC): Status: ACTIVE | Noted: 2019-01-01

## 2019-01-01 LAB
BH CV ECHO MEAS - AO MAX PG (FULL): 0.13 MMHG
BH CV ECHO MEAS - AO MAX PG: 3.6 MMHG
BH CV ECHO MEAS - AO ROOT AREA (BSA CORRECTED): 1.7
BH CV ECHO MEAS - AO ROOT AREA: 11.2 CM^2
BH CV ECHO MEAS - AO ROOT DIAM: 3.8 CM
BH CV ECHO MEAS - AO V2 MAX: 94.5 CM/SEC
BH CV ECHO MEAS - ASC AORTA: 2.7 CM
BH CV ECHO MEAS - AVA(V,A): 3.6 CM^2
BH CV ECHO MEAS - AVA(V,D): 3.6 CM^2
BH CV ECHO MEAS - BSA(HAYCOCK): 2.5 M^2
BH CV ECHO MEAS - BSA: 2.3 M^2
BH CV ECHO MEAS - BZI_BMI: 49.4 KILOGRAMS/M^2
BH CV ECHO MEAS - BZI_METRIC_HEIGHT: 162.6 CM
BH CV ECHO MEAS - BZI_METRIC_WEIGHT: 130.6 KG
BH CV ECHO MEAS - EDV(CUBED): 101.8 ML
BH CV ECHO MEAS - EDV(MOD-SP2): 43 ML
BH CV ECHO MEAS - EDV(MOD-SP4): 48 ML
BH CV ECHO MEAS - EDV(TEICH): 100.8 ML
BH CV ECHO MEAS - EF(CUBED): 65.2 %
BH CV ECHO MEAS - EF(MOD-BP): 62 %
BH CV ECHO MEAS - EF(MOD-SP2): 65.1 %
BH CV ECHO MEAS - EF(MOD-SP4): 58.3 %
BH CV ECHO MEAS - EF(TEICH): 56.7 %
BH CV ECHO MEAS - ESV(CUBED): 35.5 ML
BH CV ECHO MEAS - ESV(MOD-SP2): 15 ML
BH CV ECHO MEAS - ESV(MOD-SP4): 20 ML
BH CV ECHO MEAS - ESV(TEICH): 43.7 ML
BH CV ECHO MEAS - FS: 29.6 %
BH CV ECHO MEAS - IVS/LVPW: 1
BH CV ECHO MEAS - IVSD: 0.96 CM
BH CV ECHO MEAS - LA DIMENSION: 4.1 CM
BH CV ECHO MEAS - LA/AO: 1.1
BH CV ECHO MEAS - LAD MAJOR: 6.5 CM
BH CV ECHO MEAS - LAT PEAK E' VEL: 6.8 CM/SEC
BH CV ECHO MEAS - LATERAL E/E' RATIO: 9.9
BH CV ECHO MEAS - LV DIASTOLIC VOL/BSA (35-75): 21 ML/M^2
BH CV ECHO MEAS - LV MASS(C)D: 151.9 GRAMS
BH CV ECHO MEAS - LV MASS(C)DI: 66.5 GRAMS/M^2
BH CV ECHO MEAS - LV MAX PG: 3.4 MMHG
BH CV ECHO MEAS - LV SYSTOLIC VOL/BSA (12-30): 8.8 ML/M^2
BH CV ECHO MEAS - LV V1 MAX: 92.8 CM/SEC
BH CV ECHO MEAS - LVIDD: 4.7 CM
BH CV ECHO MEAS - LVIDS: 3.3 CM
BH CV ECHO MEAS - LVLD AP2: 6.5 CM
BH CV ECHO MEAS - LVLD AP4: 6.8 CM
BH CV ECHO MEAS - LVLS AP2: 5.8 CM
BH CV ECHO MEAS - LVLS AP4: 6.2 CM
BH CV ECHO MEAS - LVOT AREA (M): 3.8 CM^2
BH CV ECHO MEAS - LVOT AREA: 3.7 CM^2
BH CV ECHO MEAS - LVOT DIAM: 2.2 CM
BH CV ECHO MEAS - LVPWD: 0.94 CM
BH CV ECHO MEAS - MED PEAK E' VEL: 6.6 CM/SEC
BH CV ECHO MEAS - MEDIAL E/E' RATIO: 10.2
BH CV ECHO MEAS - MV A MAX VEL: 24.1 CM/SEC
BH CV ECHO MEAS - MV DEC TIME: 0.13 SEC
BH CV ECHO MEAS - MV E MAX VEL: 68.1 CM/SEC
BH CV ECHO MEAS - MV E/A: 2.8
BH CV ECHO MEAS - PA ACC SLOPE: 869.9 CM/SEC^2
BH CV ECHO MEAS - PA ACC TIME: 0.1 SEC
BH CV ECHO MEAS - PA PR(ACCEL): 36.2 MMHG
BH CV ECHO MEAS - RVDD: 2.5 CM
BH CV ECHO MEAS - SI(CUBED): 29 ML/M^2
BH CV ECHO MEAS - SI(MOD-SP2): 12.3 ML/M^2
BH CV ECHO MEAS - SI(MOD-SP4): 12.3 ML/M^2
BH CV ECHO MEAS - SI(TEICH): 25 ML/M^2
BH CV ECHO MEAS - SV(CUBED): 66.3 ML
BH CV ECHO MEAS - SV(MOD-SP2): 28 ML
BH CV ECHO MEAS - SV(MOD-SP4): 28 ML
BH CV ECHO MEAS - SV(TEICH): 57.1 ML
BH CV ECHO MEAS - TAPSE (>1.6): 1.1 CM2
BH CV ECHO MEASUREMENTS AVERAGE E/E' RATIO: 10.16
BH CV XLRA - RV BASE: 3.4 CM
BH CV XLRA - RV LENGTH: 6.6 CM
BH CV XLRA - RV MID: 3.2 CM
BH CV XLRA - TDI S': 7.54 CM/SEC
LEFT ATRIUM VOLUME INDEX: 28.5 ML/M^2
LEFT ATRIUM VOLUME: 65 ML

## 2019-01-01 PROCEDURE — 99239 HOSP IP/OBS DSCHRG MGMT >30: CPT | Performed by: HOSPITALIST

## 2019-01-01 RX ORDER — OMEGA-3S/DHA/EPA/FISH OIL/D3 300MG-1000
400 CAPSULE ORAL DAILY
COMMUNITY

## 2019-01-01 RX ORDER — ASPIRIN 81 MG/1
81 TABLET, CHEWABLE ORAL DAILY
Qty: 30 TABLET | Refills: 1 | Status: SHIPPED | OUTPATIENT
Start: 2019-01-01 | End: 2019-02-05 | Stop reason: SDUPTHER

## 2019-01-01 RX ORDER — DILTIAZEM HYDROCHLORIDE 180 MG/1
180 CAPSULE, COATED, EXTENDED RELEASE ORAL
Qty: 30 CAPSULE | Refills: 1 | Status: SHIPPED | OUTPATIENT
Start: 2019-01-01 | End: 2020-01-28

## 2019-01-01 RX ORDER — ATORVASTATIN CALCIUM 80 MG/1
80 TABLET, FILM COATED ORAL NIGHTLY
Qty: 30 TABLET | Refills: 1 | Status: SHIPPED | OUTPATIENT
Start: 2019-01-01 | End: 2019-02-05 | Stop reason: SDUPTHER

## 2019-01-01 RX ADMIN — ALLOPURINOL 100 MG: 100 TABLET ORAL at 09:04

## 2019-01-01 RX ADMIN — HYDROCODONE BITARTRATE AND ACETAMINOPHEN 1 TABLET: 10; 325 TABLET ORAL at 11:45

## 2019-01-01 RX ADMIN — SODIUM CHLORIDE, PRESERVATIVE FREE 3 ML: 5 INJECTION INTRAVENOUS at 09:05

## 2019-01-01 RX ADMIN — DILTIAZEM HYDROCHLORIDE 180 MG: 180 CAPSULE, COATED, EXTENDED RELEASE ORAL at 09:05

## 2019-01-01 RX ADMIN — APIXABAN 5 MG: 5 TABLET, FILM COATED ORAL at 09:05

## 2019-01-01 RX ADMIN — GABAPENTIN 800 MG: 400 CAPSULE ORAL at 05:04

## 2019-01-01 RX ADMIN — PANTOPRAZOLE SODIUM 40 MG: 40 TABLET, DELAYED RELEASE ORAL at 05:04

## 2019-01-01 RX ADMIN — HYDROCODONE BITARTRATE AND ACETAMINOPHEN 1 TABLET: 10; 325 TABLET ORAL at 05:04

## 2019-01-01 RX ADMIN — ASPIRIN 81 MG 81 MG: 81 TABLET ORAL at 09:04

## 2019-01-01 NOTE — DISCHARGE SUMMARY
[facility] Hospital Medicine Services  DISCHARGE SUMMARY    Patient Name: Madelyn Velazquez  : 1975  MRN: [id number]    Date of Admission: 2018  Date of Discharge:  19    Primary Care Physician: Hebert Butt MD  Consulting Cardiologist: Dr. Sheikh  Consulting Neurologist: Dr. Chelsea Ge       Consults     Date and Time Order Name Status Description    2018 0030 Inpatient Cardiology Consult Completed     2018 1644 Inpatient Neurology Consult Stroke Completed           Hospital Course     Presenting Problem:   Dysarthria [R47.1]    Active Hospital Problems    Diagnosis Date Noted   • **Ischemic stroke (CMS/[facility]) [I63.9] 2019     Priority: High   • Paroxysmal atrial fibrillation (CMS/[facility]) [I48.0] 2019   • Osteoarthritis [M19.90] 2019   • Hemoglobin A1c less than 7.0% [R73.09] 2019   • Dysarthria [R47.1] 2018      Resolved Hospital Problems   No resolved problems to display.      Hospital Course:    Mrs. Velazquez is a 43 year-old right-handed F with a past hx of GERD, chronic pain, and gout who was admitted on  after developing the acute onset of dysarthria and chest pain while at [employer].  The chest pain was self-limited, lasting only minutes, however the dysarthria persisted. Evaluation in the [facility] disclosed NIH SS of 2 and new onset atrial fibrillation.  Patient was transferred to [facility] for higher level of care.  She was not a candidate for TPA or endovascular intervention due to mild stroke.  MRI of the brain showed evidence of acute ischemic strokes of the left temporoparietal lobes, consistent with embolic phenomena.  In the setting of patient's new onset paroxysmal atrial fibrillation, it is suspected that these are cardioembolic strokes.    Patient has residual dysarthria from stroke, characterized by slow speech and imprecise articulation.  She has intact cognition, strength,  coordination, and no active sensory disturbance or limitation.  Her serum LDL was 75, her hemoglobin A1c was elevated at 6.6%.  An echocardiogram was normal with no evidence of patent foramen ovale.  She was at baseline mobility with physical therapy and thus needs no physical or occupational therapy at discharge.  An outpatient referral has been made to speech therapy in [place]. Dr. Ge saw the patient during her stay. A CTA of the Head and Neck showed no evidence of significant atherosclerotic disease.     Patient will be discharged on a low-dose aspirin, Eliquis, and high intensity atorvastatin for further stroke prevention.  Her blood pressure was within acceptable range during her hospitalization.  She does not smoke but reports a lifelong history of second hand smoke exposure as both of her parents are smokers.  Patient was counseled on day of discharge regarding her elevated hemoglobin A1c (without associated overt hyperglycemia) and need for a repeat in the future to confirm or rule out diagnosis of Type 2 DM. We discussed consistent carbohydrate diet.      For patient's paroxysmal atrial fibrillation (new onset) with ChadsVasc of 3, she will discharged on cardizem and eliquis. Dr. Sheikh evaluated the patient and will follow up with her as an outpatient.     Patient will otherwise resume her home medications at discharge.        Discharge Follow Up Recommendations for labs/diagnostics:  1. Follow up with PCP in 1 week. You will need a repeat HgbA1c to assess for diabetes in the near future.   2. Follow up with Dr. Sheikh as per his recommendations.  3. Follow up with Dr. Ge in 1-2 months or with Neurologist in [place].  4. Follow up with outpatient speech therapy in [place].    Day of Discharge     HPI:   F/u stroke    Dysarthria persists, unchanged overnight.  No headaches, focal numbness, focal weakness, or dysphagia.  Hopeful for discharge home today.    Review of Systems  Otherwise ROS is  negative except as mentioned in the HPI.    Vital Signs:   Temp:  [97.5 °F (36.4 °C)-98.6 °F (37 °C)] 98.2 °F (36.8 °C)  Heart Rate:  [73-90] 75  Resp:  [14-16] 16  BP: (135-143)/(74-93) 143/93     Physical Exam:  Constitutional: No acute distress, awake, alert, obese   HENT: NCAT, mucous membranes moist  Respiratory: Clear to auscultation bilaterally, respiratory effort normal   Cardiovascular: irreg irreg rate and rhythm, no murmurs, rubs, or gallops, palpable pedal pulses bilaterally  Gastrointestinal: Positive bowel sounds, soft, nontender, nondistended, obese   Musculoskeletal: No bilateral ankle edema  Psychiatric: Appropriate affect, cooperative  Neurologic: Oriented x 3, strength symmetric in all extremities, Cranial Nerves grossly intact to confrontation, speech slow and dysarthric  Skin: No rashes      Pertinent  and/or Most Recent Results     Results from last 7 days   Lab Units  12/31/18   0602   WBC 10*3/mm3  8.80   HEMOGLOBIN g/dL  13.3   HEMATOCRIT %  42.4   PLATELETS 10*3/mm3  267   SODIUM mmol/L  135   POTASSIUM mmol/L  4.3   CHLORIDE mmol/L  106   CO2 mmol/L  23.0   BUN mg/dL  14   CREATININE mg/dL  0.91   GLUCOSE mg/dL  100   CALCIUM mg/dL  8.9           Invalid input(s): PROT, LABALBU  Results from last 7 days   Lab Units  12/31/18   0602   CHOLESTEROL mg/dL  136   TRIGLYCERIDES mg/dL  168*   HDL CHOL mg/dL  40     Results from last 7 days   Lab Units  12/31/18   0602   HEMOGLOBIN A1C %  6.60*       Brief Urine Lab Results     None          Microbiology Results Abnormal     None          Imaging Results (all)     Procedure Component Value Units Date/Time    CT Angiogram Head With & Without Contrast [[id number]] Updated:  12/31/18 1624    CT Angiogram Neck With & Without Contrast [[id number]] Updated:  12/31/18 1624    MRI Brain With & Without Contrast [[id number]] Collected:  12/31/18 0859     Updated:  12/31/18 1443    Narrative:       EXAMINATION: MRI BRAIN W WO CONTRAST-12/31/2018:      INDICATION: Neuro deficit(s), subacute.      TECHNIQUE: Multiplanar MRI of the brain with and without intravenous  contrast administration.     COMPARISON: NONE.     FINDINGS:  Areas of restricted diffusion within the left temporoparietal  region with primarily cortical restricted diffusion consistent with  acute infarction, however, no significant mass effect or effacement of  the lateral ventricle. No midline shift. No significant white matter  disease signal aberration within the supratentorial white matter  evaluation. Pituitary and sella within normal limits. Cervicomedullary  junction demonstrates low lying cerebellar tonsils descending  approximately 4-5 mm below the level of the foramen magnum consistent  with Chiari I malformation. Globes and orbits retain normal T2 signal  characteristics. The visualized paranasal sinuses and mastoid air cells  demonstrate trace fluid within the left mastoid air cells, otherwise  grossly clear. Postcontrast administration sequences without abnormal  enhancement. Superior sagittal sinus widely patent.       Impression:       Restricted diffusion within the left temporoparietal region  single focus within the cortex of the left temporal lobe laterally as  well as somewhat serpiginous cortical restriction in the left  temporoparietal region consistent with acute infarction. No significant  mass effect or vasogenic edema. No midline shift or hydrocephalus. No  abnormal enhancement of this region or elsewhere. No significant white  matter disease signal aberration within the supratentorial white matter  background.     D:  12/31/2018  E:  12/31/2018            This report was finalized on 12/31/2018 2:41 PM by Dr. Herb Dawn.                       Results for orders placed during the hospital encounter of 12/30/18   Adult Transthoracic Echo Complete W/ Cont if Necessary Per Protocol (With Agitated Saline)    Narrative · Left ventricular systolic function is normal.  · Left  atrial cavity size is borderline dilated.            Discharge Details        Discharge Medications      New Medications      Instructions Start Date   apixaban 5 MG tablet tablet  Commonly known as:  ELIQUIS   5 mg, Oral, Every 12 Hours Scheduled      aspirin 81 MG chewable tablet   81 mg, Oral, Daily      atorvastatin 80 MG tablet  Commonly known as:  LIPITOR   80 mg, Oral, Nightly      diltiaZEM  MG 24 hr capsule  Commonly known as:  CARDIZEM CD   180 mg, Oral, Every 24 Hours Scheduled         Continue These Medications      Instructions Start Date   allopurinol 100 MG tablet  Commonly known as:  ZYLOPRIM   1 tablet, Oral, Daily      cholecalciferol 400 units tablet  Commonly known as:  VITAMIN D3   400 Units, Oral, Daily      COLCRYS 0.6 MG tablet  Generic drug:  colchicine   1 tablet, Oral, Every 12 Hours      gabapentin 800 MG tablet  Commonly known as:  NEURONTIN   800 mg, Oral, 3 Times Daily      NORCO  MG per tablet  Generic drug:  HYDROcodone-acetaminophen   1 tablet, Oral, Every 8 Hours      PRILOSEC 20 MG capsule  Generic drug:  omeprazole   40 mg, Oral      tiZANidine 2 MG tablet  Commonly known as:  ZANAFLEX   1 tablet, Oral, Every 8 Hours             Allergies   Allergen Reactions   • Latex Hives   • Tape Hives         Discharge Disposition:  Home or Self Care    Discharge Diet:  Diet Order   Procedures   • Diet Regular         Discharge Activity:   Activity Instructions     Activity as Tolerated          CODE STATUS:    Code Status and Medical Interventions:   Ordered at: 12/30/18 1644     Level Of Support Discussed With:    Patient     Code Status:    CPR     Medical Interventions (Level of Support Prior to Arrest):    Full         No future appointments.    Additional Instructions for the Follow-ups that You Need to Schedule     Discharge Follow-up with PCP   As directed       Currently Documented PCP:    Hebert Butt MD    PCP Phone Number:    [phone]     Follow Up Details:  in  1 week         Discharge Follow-up with Specified Provider: Dr. Sheikh   As directed      To:  Dr. Sheikh    Follow Up Details:  at his recommendations         Discharge Follow-up with Specified Provider: Dr. Ge in 1-2 months   As directed      To:  Dr. Ge in 1-2 months         Referral to Speech Therapy   As directed            Time Spent on Discharge:  35 minutes    Electronically signed by Hiren Tinajero MD, 01/01/19, 9:09 AM.

## 2019-01-01 NOTE — PLAN OF CARE
Problem: Patient Care Overview  Goal: Plan of Care Review  Outcome: Ongoing (interventions implemented as appropriate)   01/01/19 6371   Coping/Psychosocial   Plan of Care Reviewed With patient   Plan of Care Review   Progress improving   OTHER   Outcome Summary Pt admitted for CVA, NIH 2 d/t speech. Pt up ad perla, no deficits noted with ambulation. AFib per tele. Pt anticipating discharge to home on Tues. VSS       Problem: Stroke (Ischemic) (Adult)  Goal: Signs and Symptoms of Listed Potential Problems Will be Absent, Minimized or Managed (Stroke)  Outcome: Ongoing (interventions implemented as appropriate)      Problem: Arrhythmia/Dysrhythmia (Symptomatic) (Adult)  Goal: Signs and Symptoms of Listed Potential Problems Will be Absent, Minimized or Managed (Arrhythmia/Dysrhythmia)  Outcome: Ongoing (interventions implemented as appropriate)

## 2019-01-01 NOTE — PROGRESS NOTES
"[facility] Progress Note     LOS: 2 days   Patient Care Team:  Hebert Butt MD as PCP - General    Chief Complaint:  No chief complaint on file.      Subjective     Interval History:       thinks her speech is almost normal    No chest pain or palps    Review of Systems:   All systems were reviewed and negative.      Objective     Vital Sign Min/Max for last 24 hours  Temp  Min: 97.5 °F (36.4 °C)  Max: 98.6 °F (37 °C)   BP  Min: 135/74  Max: 143/93   Pulse  Min: 73  Max: 90   Resp  Min: 14  Max: 16   SpO2  Min: 97 %  Max: 98 %   No Data Recorded   Weight  Min: 131 kg (288 lb)  Max: 131 kg (288 lb)     Flowsheet Rows      First Filed Value   Admission Height  162.6 cm (64.02\") Documented at 12/30/2018 1605   Admission Weight  131 kg (288 lb) Documented at 12/30/2018 1605          Physical Exam:   General Appearance:alert and oriented  Lungs: clear bilaterally  Heart:IRRR   Abdomen: soft and nt  Extremities: warm and dry   No edema  Pulses: 2+        Results Review:     I reviewed the patient's new clinical results.  Results from last 7 days   Lab Units  12/31/18   0602   SODIUM mmol/L  135   POTASSIUM mmol/L  4.3   CHLORIDE mmol/L  106   CO2 mmol/L  23.0   BUN mg/dL  14   CREATININE mg/dL  0.91   GLUCOSE mg/dL  100   CALCIUM mg/dL  8.9     Results from last 7 days   Lab Units  12/31/18   0602   WBC 10*3/mm3  8.80   HEMOGLOBIN g/dL  13.3   HEMATOCRIT %  42.4   PLATELETS 10*3/mm3  267     No results found for: TROPONINT  Results from last 7 days   Lab Units  12/31/18   0602   CHOLESTEROL mg/dL  136   TRIGLYCERIDES mg/dL  168*   HDL CHOL mg/dL  40   LDL CHOL mg/dL  75               Ejection Fraction  No results found for: EF    Echo EF Estimated  Lab Results   Component Value Date    ECHOEFEST 65 06/29/2016       Nuclear Stress Ejection Fraction  No components found for: NUCEF    Cath Ejection Fraction Quantitative  No results found for: CATHEF    Physical Exam    Medication Review: yes  Current " Facility-Administered Medications   Medication Dose Route Frequency Provider Last Rate Last Dose   • allopurinol (ZYLOPRIM) tablet 100 mg  100 mg Oral Daily Tereso Valencia MD   100 mg at 12/31/18 1222   • apixaban (ELIQUIS) tablet 5 mg  5 mg Oral Q12H Laron Sheikh MD   5 mg at 12/31/18 2131   • aspirin chewable tablet 81 mg  81 mg Oral Daily Kika Thomas MD   81 mg at 12/31/18 1133    Or   • aspirin suppository 300 mg  300 mg Rectal Daily Kika Thomas MD   300 mg at 12/30/18 2015   • atorvastatin (LIPITOR) tablet 80 mg  80 mg Oral Nightly Kika Thomas MD   80 mg at 12/31/18 2131   • diltiaZEM CD (CARDIZEM CD) 24 hr capsule 180 mg  180 mg Oral Q24H Hiren Lamb PA   180 mg at 12/31/18 1231   • gabapentin (NEURONTIN) capsule 800 mg  800 mg Oral TID Tereso Valencia MD   800 mg at 01/01/19 0504   • HYDROcodone-acetaminophen (NORCO)  MG per tablet 1 tablet  1 tablet Oral Q6H PRN Tereso Valencia MD   1 tablet at 01/01/19 0504   • ondansetron (ZOFRAN) injection 4 mg  4 mg Intravenous Q6H PRN Kika Thomas MD   4 mg at 12/30/18 1917   • pantoprazole (PROTONIX) EC tablet 40 mg  40 mg Oral QAM Kika Thomas MD   40 mg at 01/01/19 0504   • sodium chloride 0.9 % flush 3 mL  3 mL Intravenous Q12H Kika Thomas MD   3 mL at 12/30/18 2015   • sodium chloride 0.9 % flush 3-10 mL  3-10 mL Intravenous PRN Kika Thomas MD         Echo--normal LVSF          Assessment/Plan      Persistent AF  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

## 2019-01-02 ENCOUNTER — EPISODE CHANGES (OUTPATIENT)
Dept: CASE MANAGEMENT | Facility: OTHER | Age: 44
End: 2019-01-02

## 2019-01-02 ENCOUNTER — READMISSION MANAGEMENT (OUTPATIENT)
Dept: CALL CENTER | Facility: HOSPITAL | Age: 44
End: 2019-01-02

## 2019-01-03 ENCOUNTER — READMISSION MANAGEMENT (OUTPATIENT)
Dept: CALL CENTER | Facility: HOSPITAL | Age: 44
End: 2019-01-03

## 2019-01-03 NOTE — OUTREACH NOTE
Stroke Week 1 Survey      Responses   Facility patient discharged from?  [place]   Does the patient have one of the following disease processes/diagnoses(primary or secondary)?  Stroke (TIA)   Is there a successful TCM telephone encounter documented?  No   Week 1 attempt successful?  Yes   Call start time  1032   Call end time  1040   Discharge diagnosis  Ischemic stroke, PAF, Osteoarthritis, HA1C less thatn 7.0%, dysarthia   Meds reviewed with patient/caregiver?  Yes   Is the patient having any side effects they believe may be caused by any medication additions or changes?  No   Does the patient have all medications ordered at discharge?  Yes   Is the patient taking all medications as directed (includes completed medication regime)?  Yes   Does the patient have a primary care provider?   Yes   Does the patient have an appointment with their PCP within 7 days of discharge?  Greater than 7 days   Comments  Patient making appts in [place] and will see the [facility] speech today 1/3   Psychosocial issues?  No   Does the patient require any assistance with activities of daily living such as eating, bathing, dressing, walking, etc.?  No   Does the patient have any residual symptoms from stroke/TIA?  Yes   Residual symptoms comments  Still with slow, garbled speech, also states she feels like her tongue is swollen and it is tingly   Does the patient understand the diet ordered at discharge?  Yes   Did the patient receive a copy of their discharge instructions?  Yes   Nursing interventions  Reviewed instructions with patient   What is the patient's perception of their health status since discharge?  Improving   Nursing interventions  Nurse provided patient education   Is the patient able to teach back FAST for Stroke?  Yes   Is the patient/caregiver able to teach back the risk factors for a stroke?  Physical inactivity and obesity, High Cholesterol   Is the patient/caregiver able to teach back signs and symptoms related to  disease process for when to call PCP?  Yes   Week 1 call completed?  Yes          Johanna Sepulveda RN

## 2019-01-09 ENCOUNTER — APPOINTMENT (OUTPATIENT)
Dept: SPEECH THERAPY | Facility: HOSPITAL | Age: 44
End: 2019-01-09

## 2019-01-11 ENCOUNTER — READMISSION MANAGEMENT (OUTPATIENT)
Dept: CALL CENTER | Facility: HOSPITAL | Age: 44
End: 2019-01-11

## 2019-01-11 NOTE — OUTREACH NOTE
Stroke Week 2 Survey      Responses   Facility patient discharged from?  [place]   Does the patient have one of the following disease processes/diagnoses(primary or secondary)?  Stroke (TIA)   Week 2 attempt successful?  No   Unsuccessful attempts  Attempt 1          Maria Luz Davidson RN

## 2019-01-14 ENCOUNTER — READMISSION MANAGEMENT (OUTPATIENT)
Dept: CALL CENTER | Facility: HOSPITAL | Age: 44
End: 2019-01-14

## 2019-01-14 NOTE — OUTREACH NOTE
Stroke Week 2 Survey      Responses   Facility patient discharged from?  [place]   Does the patient have one of the following disease processes/diagnoses(primary or secondary)?  Stroke (TIA)   Week 2 attempt successful?  No   Unsuccessful attempts  Attempt 2          Lauren Linda RN

## 2019-01-16 ENCOUNTER — HOSPITAL ENCOUNTER (OUTPATIENT)
Dept: SPEECH THERAPY | Facility: HOSPITAL | Age: 44
Setting detail: THERAPIES SERIES
Discharge: HOME | End: 2019-01-16

## 2019-01-16 ENCOUNTER — READMISSION MANAGEMENT (OUTPATIENT)
Dept: CALL CENTER | Facility: HOSPITAL | Age: 44
End: 2019-01-16

## 2019-01-16 DIAGNOSIS — R47.1 DYSARTHRIA: Primary | ICD-10-CM

## 2019-01-16 DIAGNOSIS — I63.9 ISCHEMIC STROKE (HCC): ICD-10-CM

## 2019-01-16 PROCEDURE — 92523 SPEECH SOUND LANG COMPREHEN: CPT | Performed by: SPEECH-LANGUAGE PATHOLOGIST

## 2019-01-16 NOTE — THERAPY EVALUATION
"Outpatient Speech Language Pathology   Adult Speech Language Cognitive Initial Evaluation  [facility]     Patient Name: Madelyn Velazquez  : 1975  MRN: [id number]  Today's Date: 2019        Visit Date: 2019   Patient Active Problem List   Diagnosis   • Pain in finger of right hand   • Dysarthria   • Ischemic stroke (CMS/HCC)   • Paroxysmal atrial fibrillation (CMS/HCC)   • Osteoarthritis   • Hemoglobin A1c less than 7.0%        Past Medical History:   Diagnosis Date   • Back pain    • GERD (gastroesophageal reflux disease)    • Gout    • Osteoarthritis         Past Surgical History:   Procedure Laterality Date   • CARPAL TUNNEL RELEASE Left    • HAND SURGERY Left    • LEG SURGERY Left          Visit Dx:    ICD-10-CM ICD-9-CM   1. Dysarthria R47.1 784.51   2. Ischemic stroke (CMS/HCC) I63.9 434.91     Madelyn Velazquez is seen at [facility] this pm to participate in a formal s/l and cognitive evaluation to assess safety/function in adls. Pt is positioned upright and centered in stationary chair to cooperatively participate. All results and observations of this evaluation are felt to be representative of pt’s current functional status. Pt is s/p CVA on 18 and was treated at [facility], where she was later discharged on 19.     Pt informs SLP that after the CVA, her tongue feels heavy and thick and hurts at the base and feels that this really impacts her speech. Pt also states that her upper lip tingles and feels like a “muscle spasm”. Per pt report, speech has improved since initial CVA. During evaluation, pt is observed w/ slow, labored, slurred speech.     Mrs. Velazquez is observed on ra w/o complications.      Receptive language skills are intact. Pt is able to id common objects and personal body parts, follow simple and multi-step directives, understand complex \"wh\" questions and participate in conversational exchange w/o difficulties.     Expressive language skills are " "intact. Pt is able to complete automatic speech tasks, confrontation naming tasks, complete complex oe sentences, respond to complet oe \"wh\" questions, repeat at word/sentence and multiple digit levels, as well as participate in complex conversational exchange. No aphasia, anomia or verbal apraxia evidenced.     Pt is independently oriented to person, place and time. Immediate, STM and LTM are wfl w/ pt recalling recent adls and providing personal information w/o delays. Thought organization, processing and problem solving are wfl w/ pt demonstrating appropriate comparing/contrasting, understanding of idiomatic language, convergent and divergent thinking skills.     Facial/oral structures are symmetrical upon observation. Oral mucosa are moist, pink and clean. Secretions are clear, thin and well controlled. OROM/SHAD is moderately weak to imitate oral postures w/o significant lingual deviation upon protrusion. Speech intensity, clarity and intelligibility are moderately dysarthric w/ 80% intelligible in known context, 70% intelligible in unknown context.      Graphic and reading skills are intact, premorbid baseline status. Pt is able to id isolated letters, simple and moderate words, as well as sentences and small paragraphs. Signature is legible.     Pragmatic skills are wfl w/ appropriate eye gaze patterns and visual tracking. Language is appropriate in context w/ topic initiation and maintenance independently. No left neglect evidenced. Humor response is intact w/ appropriate affect.     Impression: Mrs. Velazquez presents w/ mild-moderate dysarthria w/ 80% intelligibility in known context and 70% intelligibility in unknown context across this evaluation. No other deficits evidenced across this evaluation      D/w pt results and recommendations w/ verbal understanding and agreement.       PLAN OF CARE:     Long Term Goal:  1. Pt will improve verbal expression to allow for maximal communication and safety in adls. "      Short Term Goals:  1. Pt will tolerate facial massage to facial surface for 3-7 minutes to increase surface blood flow, sensation and ROM over 3 consecutive sessions.   2. Pt will perform OROM/SHAD exercises x3 sets x10 reps w/ min cues.  3. Pt will verbally produce multi-syllabic words w/ 100% acc and min cues over 3 consecutive session.   4. Pt will perform SIMIN tasks 3/5 opp w/ min cues over 3 consecutive sessions.   5. Pt will improve intelligibility of speech to 90% for unknown context, 100% for known context w/ min cues over 3 consecutive sessions.   6. Pt will overarticulate and hyperphonate at the phrase/sentence level 3/5 opp w/ min cues over 3 consecutive sessions.   7. Pt will increase rate of speech on connected speech 3/5 opp w/ min cues over 3 consecutive sessions.   8. Pt will self-monitor and correct rate of speech 3/5 opp over 3 consecutive sessions.       *goals may be added/modified pending progress towards     Thank you-  Mary Leslie M.A., CFY-SLP      SLP [facility] Evaluation - 01/16/19 1708        Motor Speech Assessment/Intervention    Motor Speech Function  moderate impairment   -BS    Characteristics Consistent with Dysarthria  slow rate;decreased articulation;decreased intensity;slurred speech   -BS      User Key  (r) = Recorded By, (t) = Taken By, (c) = Cosigned By    Initials Name Provider Type    BS Mary Leslie CCC-SLP Speech Therapist          OP SLP Education     Row Name 01/16/19 6813       Education    Barriers to Learning  No barriers identified  -BS    Education Provided  Described results of evaluation;Family/caregivers expressed understanding of evaluation;Patient expressed understanding of evaluation  -BS    Assessed  Learning needs;Learning motivation;Learning preferences;Learning readiness  -BS    Learning Motivation  Strong  -BS    Learning Method  Explanation;Demonstration  -BS    Teaching Response  Verbalized understanding;Demonstrated understanding  -BS     Education Comments  d/w pt and pt's  results of evaluation. Both verbalize understanding and agreement.   -BS      User Key  (r) = Recorded By, (t) = Taken By, (c) = Cosigned By    Initials Name Effective Dates    Mary Dave CCC-SLP 05/14/18 -           SLP OP Goals     Row Name 01/16/19 1707          SLP Time Calculation    SLP Goal Re-Cert Due Date  02/16/19  -BS       User Key  (r) = Recorded By, (t) = Taken By, (c) = Cosigned By    Initials Name Provider Type    Mary Dave CCC-SLP Speech Therapist          OP SLP Assessment/Plan - 01/16/19 1700        SLP Assessment    Functional Problems  Speech Language- Adult/Cognition   -BS    Impact on Function: Adult Speech Language/Cognition  Difficulty communicating wants, needs, and ideas;Difficulty communicating in a medical emergency;Restrictions in personal and social life   -BS    Clinical Impression: Speech Language-Adult/Congnition  Mild-Moderate:;Dysarthria- Flaccid   -BS    Please refer to paper survey for additional self-reported information  Yes   -BS    Please refer to items scanned into chart for additional diagnostic informaiton and handouts as provided by clinician  Yes   -BS    SLP Diagnosis  Mild-Moderate:;Dysarthria- Flaccid   -BS    Prognosis  Good (comment)   -BS    Patient/caregiver participated in establishment of treatment plan and goals  Yes   -BS    Patient would benefit from skilled therapy intervention  Yes   -BS       SLP Plan    Frequency  2x week   -BS    Duration  x12 weeks   -BS    Planned CPT's?  SLP INDIVIDUAL SPEECH THERAPY: 94881   -BS    Expected Duration Therapy Session - minutes  15-30 minutes;30-45 minutes   -BS    Plan Comments  Evaluation complete. Initiate POC   -BS      User Key  (r) = Recorded By, (t) = Taken By, (c) = Cosigned By    Initials Name Provider Type    Mary Dave CCC-SLP Speech Therapist          Time Calculation:   SLP Start Time: 1430  SLP Stop Time: 1525  SLP Time  Calculation (min): 55 min  SLP Non-Billable Time (min): 60 min    Therapy Charges for Today     Code Description Service Date Service Provider Modifiers Qty    44262853103  ST CARE PLAN 15 MIN 1/16/2019 Mary Leslie CCC-SLP GN 4    [id number] [facility] EVAL SPEECH AND PROD W LANG  4 1/16/2019 Mary Leslie CCC-SLP GN 1          ROSA Nolasco  1/16/2019

## 2019-01-16 NOTE — OUTREACH NOTE
Stroke Week 3 Survey      Responses   Facility patient discharged from?  [place]   Does the patient have one of the following disease processes/diagnoses(primary or secondary)?  Stroke (TIA)   Week 3 attempt successful?  No   Unsuccessful attempts  Attempt 1          Shell Huerta RN

## 2019-01-17 ENCOUNTER — READMISSION MANAGEMENT (OUTPATIENT)
Dept: CALL CENTER | Facility: HOSPITAL | Age: 44
End: 2019-01-17

## 2019-01-17 NOTE — OUTREACH NOTE
Stroke Week 3 Survey      Responses   Facility patient discharged from?  [place]   Does the patient have one of the following disease processes/diagnoses(primary or secondary)?  Stroke (TIA)   Week 3 attempt successful?  No   Unsuccessful attempts  Attempt 2          Shell Huerta RN

## 2019-01-18 ENCOUNTER — APPOINTMENT (OUTPATIENT)
Dept: SPEECH THERAPY | Facility: HOSPITAL | Age: 44
End: 2019-01-18

## 2019-01-21 ENCOUNTER — HOSPITAL ENCOUNTER (OUTPATIENT)
Dept: SPEECH THERAPY | Facility: HOSPITAL | Age: 44
Setting detail: THERAPIES SERIES
Discharge: HOME | End: 2019-01-21

## 2019-01-21 DIAGNOSIS — R47.1 DYSARTHRIA: Primary | ICD-10-CM

## 2019-01-21 DIAGNOSIS — I63.9 ISCHEMIC STROKE (HCC): ICD-10-CM

## 2019-01-21 PROCEDURE — 92507 TX SP LANG VOICE COMM INDIV: CPT | Performed by: SPEECH-LANGUAGE PATHOLOGIST

## 2019-01-21 NOTE — PROGRESS NOTES
Outpatient Speech Language Pathology   Adult Speech Language Cognitive Treatment Note  [facility]     Patient Name: Madelyn Velazquez  : 1975  MRN: [id number]  Today's Date: 2019         Visit Date: 2019   Patient Active Problem List   Diagnosis   • Pain in finger of right hand   • Dysarthria   • Ischemic stroke (CMS/HCC)   • Paroxysmal atrial fibrillation (CMS/HCC)   • Osteoarthritis   • Hemoglobin A1c less than 7.0%          Visit Dx:    ICD-10-CM ICD-9-CM   1. Dysarthria R47.1 784.51   2. Ischemic stroke (CMS/HCC) I63.9 434.91     Long Term Goal:  1. Pt will improve verbal expression to allow for maximal communication and safety in adls.      Short Term Goals:  1. Pt will tolerate facial massage to facial surface for 3-7 minutes to increase surface blood flow, sensation and ROM over 3 consecutive sessions.   *not directly addressed during today's session     2. Pt will perform OROM/SHAD exercises x3 sets x10 reps w/ min cues.  *pt performs OROM exercises x3 sets x10 reps w/ max cues    3. Pt will verbally produce multi-syllabic words w/ 100% acc and min cues over 3 consecutive session.   *pt verbally produces multi-sullabic words w/ 70% acc w/ mod cues. Pt w/ most difficulty w/ stops.     4. Pt will perform SIMIN tasks 3/5 opp w/ min cues over 3 consecutive sessions.  *pt performs SIMIN tasks 3/5 opp w/ max cues     5. Pt will improve intelligibility of speech to 90% for unknown context, 100% for known context w/ min cues over 3 consecutive sessions.   *pt improves intelligibility of speech to 80% unknown context, 90% known context w/ mod cues. Pt still exhibits slowed slurred speech.     6. Pt will overarticulate and hyperphonate at the phrase/sentence level 3/5 opp w/ min cues over 3 consecutive sessions.   *pt overarticulates and hyperphonates at the phrase level in 2/5 opp w/ mod cues    7. Pt will increase rate of speech on connected speech 3/5 opp w/ min cues over 3 consecutive sessions.   *not  directly addressed during today's session    8. Pt will self-monitor and correct rate of speech 3/5 opp over 3 consecutive sessions.     *not directly addressed during today's session    *goals may be added/modified pending progress towards     Thank you-  Mary Leslie M.A., [facility]-SLP        OP SLP Education     Row Name 01/21/19 1255       Education    Education Comments  d/w pt progress towards goals and recommendations for practice at home. She verbalizes understanding and agreement.   -KIRK      User Key  (r) = Recorded By, (t) = Taken By, (c) = Cosigned By    Initials Name Effective Dates    Mary Dave CCC-SLP 05/14/18 -           Time Calculation:   SLP Start Time: 1130  SLP Stop Time: 1200  SLP Time Calculation (min): 30 min  SLP Non-Billable Time (min): 30 min    Therapy Charges for Today     Code Description Service Date Service Provider Modifiers Qty    99437023270 HC ST CARE PLAN 15 MIN 1/21/2019 Mary Leslie CCC-SLP GN 2    76049432493 HC ST TREATMENT SPEECH 2 1/21/2019 Mary Leslie CCC-SLP GN 1                   Mary Leslie CCC-SLP  1/21/2019

## 2019-01-22 ENCOUNTER — TRANSCRIBE ORDERS (OUTPATIENT)
Dept: ADMINISTRATIVE | Facility: HOSPITAL | Age: 44
End: 2019-01-22

## 2019-01-22 DIAGNOSIS — I48.19 PERSISTENT ATRIAL FIBRILLATION (HCC): Primary | ICD-10-CM

## 2019-01-30 ENCOUNTER — HOSPITAL ENCOUNTER (OUTPATIENT)
Dept: CARDIOLOGY | Facility: HOSPITAL | Age: 44
Discharge: HOME | End: 2019-01-30
Attending: INTERNAL MEDICINE | Admitting: INTERNAL MEDICINE

## 2019-01-30 VITALS
OXYGEN SATURATION: 99 % | DIASTOLIC BLOOD PRESSURE: 80 MMHG | SYSTOLIC BLOOD PRESSURE: 125 MMHG | WEIGHT: 280.2 LBS | HEIGHT: 64 IN | HEART RATE: 70 BPM | TEMPERATURE: 97.7 F | BODY MASS INDEX: 47.84 KG/M2 | RESPIRATION RATE: 18 BRPM

## 2019-01-30 DIAGNOSIS — I48.20 CHRONIC ATRIAL FIBRILLATION (HCC): ICD-10-CM

## 2019-01-30 LAB
ANION GAP SERPL CALCULATED.3IONS-SCNC: 7 MMOL/L (ref 3–11)
BUN BLD-MCNC: 12 MG/DL (ref 9–23)
BUN/CREAT SERPL: 14.3 (ref 7–25)
CALCIUM SPEC-SCNC: 9.1 MG/DL (ref 8.7–10.4)
CHLORIDE SERPL-SCNC: 105 MMOL/L (ref 99–109)
CO2 SERPL-SCNC: 23 MMOL/L (ref 20–31)
CREAT BLD-MCNC: 0.84 MG/DL (ref 0.6–1.3)
DEPRECATED RDW RBC AUTO: 47.2 FL (ref 37–54)
ERYTHROCYTE [DISTWIDTH] IN BLOOD BY AUTOMATED COUNT: 14 % (ref 11.3–14.5)
GFR SERPL CREATININE-BSD FRML MDRD: 74 ML/MIN/1.73
GLUCOSE BLD-MCNC: 127 MG/DL (ref 70–100)
HCT VFR BLD AUTO: 41.2 % (ref 34.5–44)
HGB BLD-MCNC: 13.3 G/DL (ref 11.5–15.5)
MCH RBC QN AUTO: 30 PG (ref 27–31)
MCHC RBC AUTO-ENTMCNC: 32.3 G/DL (ref 32–36)
MCV RBC AUTO: 92.8 FL (ref 80–99)
PLATELET # BLD AUTO: 242 10*3/MM3 (ref 150–450)
PMV BLD AUTO: 11.5 FL (ref 6–12)
POTASSIUM BLD-SCNC: 4.1 MMOL/L (ref 3.5–5.5)
RBC # BLD AUTO: 4.44 10*6/MM3 (ref 3.89–5.14)
SODIUM BLD-SCNC: 135 MMOL/L (ref 132–146)
WBC NRBC COR # BLD: 11.78 10*3/MM3 (ref 3.5–10.8)

## 2019-01-30 PROCEDURE — 93005 ELECTROCARDIOGRAM TRACING: CPT | Performed by: INTERNAL MEDICINE

## 2019-01-30 PROCEDURE — 80048 BASIC METABOLIC PNL TOTAL CA: CPT | Performed by: INTERNAL MEDICINE

## 2019-01-30 PROCEDURE — 36415 COLL VENOUS BLD VENIPUNCTURE: CPT

## 2019-01-30 PROCEDURE — 93312 ECHO TRANSESOPHAGEAL: CPT

## 2019-01-30 PROCEDURE — 85027 COMPLETE CBC AUTOMATED: CPT | Performed by: INTERNAL MEDICINE

## 2019-01-30 PROCEDURE — 93321 DOPPLER ECHO F-UP/LMTD STD: CPT

## 2019-01-30 PROCEDURE — 93325 DOPPLER ECHO COLOR FLOW MAPG: CPT

## 2019-01-30 PROCEDURE — 25010000002 FENTANYL CITRATE (PF) 100 MCG/2ML SOLUTION: Performed by: INTERNAL MEDICINE

## 2019-01-30 PROCEDURE — 25010000002 MIDAZOLAM PER 1 MG: Performed by: INTERNAL MEDICINE

## 2019-01-30 PROCEDURE — 92960 CARDIOVERSION ELECTRIC EXT: CPT

## 2019-01-30 RX ORDER — CLOPIDOGREL BISULFATE 75 MG/1
75 TABLET ORAL DAILY
Status: CANCELLED | OUTPATIENT
Start: 2019-01-30

## 2019-01-30 RX ORDER — MIDAZOLAM HYDROCHLORIDE 1 MG/ML
INJECTION INTRAMUSCULAR; INTRAVENOUS
Status: DISCONTINUED
Start: 2019-01-30 | End: 2019-01-30 | Stop reason: HOSPADM

## 2019-01-30 RX ORDER — TEMAZEPAM 7.5 MG/1
7.5 CAPSULE ORAL NIGHTLY PRN
Status: CANCELLED | OUTPATIENT
Start: 2019-01-30 | End: 2019-02-09

## 2019-01-30 RX ORDER — DILTIAZEM HYDROCHLORIDE 180 MG/1
180 CAPSULE, COATED, EXTENDED RELEASE ORAL
Status: CANCELLED | OUTPATIENT
Start: 2019-01-30

## 2019-01-30 RX ORDER — HYDROCODONE BITARTRATE AND ACETAMINOPHEN 10; 325 MG/1; MG/1
1 TABLET ORAL EVERY 8 HOURS
Status: CANCELLED | OUTPATIENT
Start: 2019-01-30

## 2019-01-30 RX ORDER — ACETAMINOPHEN 325 MG/1
650 TABLET ORAL EVERY 4 HOURS PRN
Status: CANCELLED | OUTPATIENT
Start: 2019-01-30

## 2019-01-30 RX ORDER — FENTANYL CITRATE 50 UG/ML
INJECTION, SOLUTION INTRAMUSCULAR; INTRAVENOUS
Status: COMPLETED | OUTPATIENT
Start: 2019-01-30 | End: 2019-01-30

## 2019-01-30 RX ORDER — TIZANIDINE 4 MG/1
2 TABLET ORAL EVERY 8 HOURS
Status: CANCELLED | OUTPATIENT
Start: 2019-01-30

## 2019-01-30 RX ORDER — ATORVASTATIN CALCIUM 40 MG/1
80 TABLET, FILM COATED ORAL NIGHTLY
Status: CANCELLED | OUTPATIENT
Start: 2019-01-30

## 2019-01-30 RX ORDER — NALOXONE HYDROCHLORIDE 0.4 MG/ML
INJECTION, SOLUTION INTRAMUSCULAR; INTRAVENOUS; SUBCUTANEOUS
Status: DISCONTINUED
Start: 2019-01-30 | End: 2019-01-30 | Stop reason: WASHOUT

## 2019-01-30 RX ORDER — MORPHINE SULFATE 2 MG/ML
1 INJECTION, SOLUTION INTRAMUSCULAR; INTRAVENOUS EVERY 4 HOURS PRN
Status: CANCELLED | OUTPATIENT
Start: 2019-01-30 | End: 2019-02-09

## 2019-01-30 RX ORDER — HYDROCODONE BITARTRATE AND ACETAMINOPHEN 5; 325 MG/1; MG/1
1 TABLET ORAL EVERY 4 HOURS PRN
Status: CANCELLED | OUTPATIENT
Start: 2019-01-30 | End: 2019-02-09

## 2019-01-30 RX ORDER — ALLOPURINOL 100 MG/1
100 TABLET ORAL DAILY
Status: CANCELLED | OUTPATIENT
Start: 2019-01-30

## 2019-01-30 RX ORDER — FENTANYL CITRATE 50 UG/ML
INJECTION, SOLUTION INTRAMUSCULAR; INTRAVENOUS
Status: DISCONTINUED
Start: 2019-01-30 | End: 2019-01-30 | Stop reason: HOSPADM

## 2019-01-30 RX ORDER — ASPIRIN 81 MG/1
81 TABLET, CHEWABLE ORAL DAILY
Status: CANCELLED | OUTPATIENT
Start: 2019-01-30

## 2019-01-30 RX ORDER — OMEGA-3S/DHA/EPA/FISH OIL/D3 300MG-1000
400 CAPSULE ORAL DAILY
Status: CANCELLED | OUTPATIENT
Start: 2019-01-30

## 2019-01-30 RX ORDER — MIDAZOLAM HYDROCHLORIDE 1 MG/ML
INJECTION INTRAMUSCULAR; INTRAVENOUS
Status: COMPLETED | OUTPATIENT
Start: 2019-01-30 | End: 2019-01-30

## 2019-01-30 RX ORDER — ALPRAZOLAM 0.25 MG/1
0.25 TABLET ORAL 3 TIMES DAILY PRN
Status: CANCELLED | OUTPATIENT
Start: 2019-01-30 | End: 2019-02-09

## 2019-01-30 RX ORDER — NALOXONE HCL 0.4 MG/ML
0.4 VIAL (ML) INJECTION
Status: CANCELLED | OUTPATIENT
Start: 2019-01-30

## 2019-01-30 RX ORDER — FLUMAZENIL 0.1 MG/ML
INJECTION INTRAVENOUS
Status: DISCONTINUED
Start: 2019-01-30 | End: 2019-01-30 | Stop reason: WASHOUT

## 2019-01-30 RX ADMIN — FENTANYL CITRATE 50 MCG: 50 INJECTION, SOLUTION INTRAMUSCULAR; INTRAVENOUS at 14:19

## 2019-01-30 RX ADMIN — MIDAZOLAM HYDROCHLORIDE 2 MG: 1 INJECTION, SOLUTION INTRAMUSCULAR; INTRAVENOUS at 14:19

## 2019-01-30 RX ADMIN — MIDAZOLAM HYDROCHLORIDE 2 MG: 1 INJECTION, SOLUTION INTRAMUSCULAR; INTRAVENOUS at 14:22

## 2019-01-30 RX ADMIN — MIDAZOLAM HYDROCHLORIDE 2 MG: 1 INJECTION, SOLUTION INTRAMUSCULAR; INTRAVENOUS at 14:24

## 2019-01-31 ENCOUNTER — DOCUMENTATION (OUTPATIENT)
Dept: CARDIAC REHAB | Facility: HOSPITAL | Age: 44
End: 2019-01-31

## 2019-01-31 LAB
BH CV ECHO MEAS - BSA(HAYCOCK): 2.5 M^2
BH CV ECHO MEAS - BSA: 2.3 M^2
BH CV ECHO MEAS - BZI_BMI: 48.1 KILOGRAMS/M^2
BH CV ECHO MEAS - BZI_METRIC_HEIGHT: 162.6 CM
BH CV ECHO MEAS - BZI_METRIC_WEIGHT: 127 KG

## 2019-02-05 ENCOUNTER — OFFICE VISIT (OUTPATIENT)
Dept: NEUROLOGY | Facility: CLINIC | Age: 44
End: 2019-02-05

## 2019-02-05 VITALS
HEIGHT: 64 IN | WEIGHT: 278 LBS | DIASTOLIC BLOOD PRESSURE: 84 MMHG | BODY MASS INDEX: 47.46 KG/M2 | SYSTOLIC BLOOD PRESSURE: 128 MMHG | HEART RATE: 65 BPM | OXYGEN SATURATION: 98 %

## 2019-02-05 DIAGNOSIS — R47.01 EXPRESSIVE APHASIA: ICD-10-CM

## 2019-02-05 DIAGNOSIS — I63.9 ISCHEMIC STROKE (HCC): Primary | ICD-10-CM

## 2019-02-05 DIAGNOSIS — E11.9 TYPE 2 DIABETES MELLITUS WITHOUT COMPLICATION, WITHOUT LONG-TERM CURRENT USE OF INSULIN (HCC): ICD-10-CM

## 2019-02-05 DIAGNOSIS — R06.83 SNORING: ICD-10-CM

## 2019-02-05 PROCEDURE — 99214 OFFICE O/P EST MOD 30 MIN: CPT | Performed by: NURSE PRACTITIONER

## 2019-02-05 RX ORDER — ATORVASTATIN CALCIUM 80 MG/1
80 TABLET, FILM COATED ORAL NIGHTLY
Qty: 90 TABLET | Refills: 2 | Status: SHIPPED | OUTPATIENT
Start: 2019-02-05

## 2019-02-05 RX ORDER — ASPIRIN 81 MG/1
81 TABLET, CHEWABLE ORAL DAILY
Qty: 90 TABLET | Refills: 2 | Status: SHIPPED | OUTPATIENT
Start: 2019-02-05

## 2019-02-05 RX ORDER — OXYCODONE AND ACETAMINOPHEN 10; 325 MG/1; MG/1
1 TABLET ORAL EVERY 8 HOURS
COMMUNITY
Start: 2019-01-25

## 2019-02-05 NOTE — PROGRESS NOTES
Subjective:     Patient ID: Madelyn Velazquez is a 43 y.o. female.    CC:   Chief Complaint   Patient presents with   • Stroke       HPI:   History of Present Illness     This is a 43-year-old female who presents for [facility] stroke follow-up.  She is accompanied by her  during today's visit.  She was admitted to [facility] from 12/30/2018 until 1/1/2019 for dysarthria and chest pain.  She was found on MRI of the brain to have an acute left parietotemporal embolic stroke distribution.  She was also found to have atrial fibrillation.  She did have a CTA of the head and neck which showed no hemodynamically significant stenosis.  She did have an echocardiogram with normal ejection fraction.  She was evaluated by cardiology and placed on Eliquis and diltiazem.  She is currently on aspirin and atorvastatin for secondary stroke prevention.  She did have an attempted cardiac ablation last week.  She is scheduled to follow-up with Dr. Sheikh cardiology soon.  She is seeing speech therapy for continued expressive aphasia which has improved.  She tells me that difficulty with word finding worsens when she is stressed or really fatigued.  She has no other long-term effects of her stroke.  She has never had a sleep study but her  does admit that she does snore at times.  She would be willing to have a sleep study.  She also had an elevated hemoglobin A1c of 6.6% on admission and she has not had this rechecked but tells me she has lost 19 pounds since her hospital admission and wants to be followed by her primary care provider and if it is still elevated she would be willing to try oral diabetic medications.    The following portions of the patient's history were reviewed and updated as appropriate: allergies, current medications, past family history, past medical history, past social history, past surgical history and problem list.    Past Medical History:   Diagnosis Date   • A-fib  (CMS/HCC)    • Back pain    • CTS (carpal tunnel syndrome)    • GERD (gastroesophageal reflux disease)    • Gout    • Migraine    • Osteoarthritis    • Stroke (CMS/HCC)        Past Surgical History:   Procedure Laterality Date   • CARPAL TUNNEL RELEASE Left    • CHOLECYSTECTOMY     • HAND SURGERY Left    • LEG SURGERY Left    • OVARIAN CYST SURGERY         Social History     Socioeconomic History   • Marital status:      Spouse name: Not on file   • Number of children: Not on file   • Years of education: Not on file   • Highest education level: Not on file   Social Needs   • Financial resource strain: Not on file   • Food insecurity - worry: Not on file   • Food insecurity - inability: Not on file   • Transportation needs - medical: Not on file   • Transportation needs - non-medical: Not on file   Occupational History   • Not on file   Tobacco Use   • Smoking status: Never Smoker   • Smokeless tobacco: Never Used   Substance and Sexual Activity   • Alcohol use: No   • Drug use: No   • Sexual activity: Defer   Other Topics Concern   • Not on file   Social History Narrative   • Not on file       Family History   Problem Relation Age of Onset   • Osteoarthritis Mother    • Heart disease Mother    • Hypertension Mother    • Osteoarthritis Father    • Hypertension Father    • Osteoarthritis Sister    • Rheum arthritis Sister    • Heart disease Sister    • Hypertension Sister    • Migraines Sister    • Osteoarthritis Brother    • Heart disease Brother    • Hypertension Brother    • Osteoarthritis Maternal Grandmother    • Heart disease Maternal Grandmother    • Hypertension Maternal Grandmother    • Osteoarthritis Maternal Grandfather    • Heart disease Maternal Grandfather    • Hypertension Maternal Grandfather         Review of Systems   Constitutional: Negative.    HENT: Negative.    Eyes: Negative.    Respiratory: Negative.    Cardiovascular: Negative.    Gastrointestinal: Negative.    Endocrine: Negative.     Genitourinary: Negative.    Musculoskeletal: Negative.    Skin: Negative.    Allergic/Immunologic: Negative.    Neurological: Positive for speech difficulty.   Hematological: Negative.    Psychiatric/Behavioral: Negative.         Objective:    Neurologic Exam     Mental Status   Oriented to person, place, and time.   Registration: recalls 3 of 3 objects. Recall at 5 minutes: recalls 3 of 3 objects. Follows 3 step commands.   Attention: normal. Concentration: normal.   Speech: (No dysarthria, minimal expressive aphasia)  Level of consciousness: alert  Knowledge: good and consistent with education. Able to perform simple calculations.   Able to name object. Able to read. Able to repeat. Able to write. Normal comprehension.     Cranial Nerves   Cranial nerves II through XII intact.     Motor Exam   Muscle bulk: normal  Overall muscle tone: normal    Strength   Strength 5/5 throughout.     Sensory Exam   Light touch normal.   Vibration normal.   Proprioception normal.   Pinprick normal.     Gait, Coordination, and Reflexes     Gait  Gait: normal    Coordination   Romberg: negative  Finger to nose coordination: normal  Heel to shin coordination: normal    Tremor   Resting tremor: absent  Intention tremor: absent  Action tremor: absent    Reflexes   Right brachioradialis: 2+  Left brachioradialis: 2+  Right biceps: 2+  Left biceps: 2+  Right triceps: 2+  Left triceps: 2+  Right patellar: 2+  Left patellar: 2+  Right achilles: 2+  Left achilles: 2+  Right : 2+  Left : 2+  Right plantar: normal  Left plantar: normal  Right Camarena: absent  Left Camarena: absent  Right ankle clonus: absent  Left ankle clonus: absent      Physical Exam   Constitutional: She is oriented to person, place, and time. She appears well-developed and well-nourished.   Cardiovascular: An irregular rhythm present.   Pulmonary/Chest: Effort normal and breath sounds normal.   Neurological: She is oriented to person, place, and time. She has  normal strength. She has a normal Finger-Nose-Finger Test, a normal Heel to Ervin Test and a normal Romberg Test. Gait normal.   Reflex Scores:       Tricep reflexes are 2+ on the right side and 2+ on the left side.       Bicep reflexes are 2+ on the right side and 2+ on the left side.       Brachioradialis reflexes are 2+ on the right side and 2+ on the left side.       Patellar reflexes are 2+ on the right side and 2+ on the left side.       Achilles reflexes are 2+ on the right side and 2+ on the left side.  Psychiatric: Her behavior is normal. Judgment and thought content normal. Her mood appears anxious. Her speech is delayed. Cognition and memory are normal.       Assessment/Plan:       Madelyn was seen today for stroke.    Diagnoses and all orders for this visit:    Ischemic stroke (CMS/[facility])  -     Ambulatory Referral to Sleep Medicine  -     atorvastatin (LIPITOR) 80 MG tablet; Take 1 tablet by mouth Every Night.  -     aspirin 81 MG chewable tablet; Chew 1 tablet Daily.    Expressive aphasia  Comments:  Continue with Speech Therapy-should improve with time.    Snoring  -     Ambulatory Referral to Sleep Medicine    Type 2 diabetes mellitus without complication, without long-term current use of insulin (CMS/[facility])  Comments:  hgba1c 6.6% 12/30/19-recheck with PCP and consider adding oral agents         Continue current meds. Recommend repeat hgba1c at 3 months post hospital and if still above 6.4% consider medication management with PCP. Continue speech therapy. Keep appointment with Cardiology. Sleep study to eval for VIKAS. F/U in 3 months or sooner if needed. Reviewed medications, potential side effects and signs and symptoms to report. Discussed risk versus benefits of treatment plan with patient and/or family-including medications, labs and radiology that may be ordered. Addressed questions and concerns during visit. Patient and/or family verbalized understanding and agree with plan.    Discussed signs and  symptoms of stroke and when to call 911. Instructed to follow a low fat diet including the Mediterranean diet. Instructed to take all medications daily as prescribed. Encouraged 30 minutes of exercise 3-4 times a week as tolerated. Stay well hydrated. Discussed potential side effects of new medications and signs and symptoms to report. If patient is currently using tobacco products, smoking cessation was encouraged. Reviewed stroke risk factors and stroke prevention plan. Patient and/or family verbalizes understanding and agrees with plan.     During this visit the following were done:  Labs Reviewed [x]    Labs Ordered []    Radiology Reports Reviewed [x]    Radiology Ordered []    PCP Records Reviewed []    Referring Provider Records Reviewed []    ER Records Reviewed [x]    Hospital Records Reviewed [x]    History Obtained From Family []    Radiology Images Reviewed [x]    Other Reviewed [x]    Records Requested []      EMR Dragon/Transcription Disclaimer:  Much of this encounter note is an electronic transcription of spoken language to printed text. Electronic transcription of spoken language may permit erroneous words or phrases to be inadvertently transcribed. Although I have reviewed the note for such errors, some may still exist in this documentation.        Lia Mullins, APRN  2/5/2019

## 2019-02-08 ENCOUNTER — HOSPITAL ENCOUNTER (OUTPATIENT)
Dept: SPEECH THERAPY | Facility: HOSPITAL | Age: 44
Setting detail: THERAPIES SERIES
Discharge: HOME | End: 2019-02-08

## 2019-02-08 DIAGNOSIS — I63.9 ISCHEMIC STROKE (HCC): ICD-10-CM

## 2019-02-08 DIAGNOSIS — R47.1 DYSARTHRIA: Primary | ICD-10-CM

## 2019-02-08 PROCEDURE — 92507 TX SP LANG VOICE COMM INDIV: CPT | Performed by: SPEECH-LANGUAGE PATHOLOGIST

## 2019-02-08 NOTE — PROGRESS NOTES
"Outpatient Speech Language Pathology   Adult Speech Language Cognitive Treatment Note  JAY De Oliveira     Patient Name: Madelyn Velazquez  : 1975  MRN: [id number]  Today's Date: 2019         Visit Date: 2019   Patient Active Problem List   Diagnosis   • Pain in finger of right hand   • Dysarthria   • Ischemic stroke (CMS/HCC)   • Paroxysmal atrial fibrillation (CMS/HCC)   • Osteoarthritis   • Hemoglobin A1c less than 7.0%   • Chronic atrial fibrillation (CMS/HCC)   • Expressive aphasia   • Type 2 diabetes mellitus without complication, without long-term current use of insulin (CMS/HCC)          Visit Dx:    ICD-10-CM ICD-9-CM   1. Dysarthria R47.1 784.51   2. Ischemic stroke (CMS/HCC) I63.9 434.91     Long Term Goal:  1. Pt will improve verbal expression to allow for maximal communication and safety in adls.      Short Term Goals:  1. Pt will tolerate facial massage to facial surface for 3-7 minutes to increase surface blood flow, sensation and ROM over 3 consecutive sessions.   *goal discontinued     2. Pt will perform OROM/SHAD exercises x3 sets x10 reps w/ min cues.  *pt performs OROM exercises x3 sets x10 reps w/ max cues. Pt states that when she moves here tongue, her tongue hurts around the area of the lingual frenulum. Pt states that \"it feels like a rubber band snapping.\" Pt informs SLP that she has had severe pain in her tongue following her stroke.     3. Pt will verbally produce multi-syllabic words w/ 100% acc and min cues over 3 consecutive session.   *pt verbally produces multi-sullabic words w/ 70% acc w/ mod cues. Pt w/ most difficulty w/ stops.     4. Pt will perform SIMIN tasks 3/5 opp w/ min cues over 3 consecutive sessions.  *pt performs SIMIN tasks 2/5 opp w/ max cues     5. Pt will improve intelligibility of speech to 90% for unknown context, 100% for known context w/ min cues over 3 consecutive sessions.   *pt improves intelligibility of speech to 80% unknown context, 90% known " context w/ mod cues. Pt still exhibits slowed slurred speech; however, improvement since last treatment session.     6. Pt will overarticulate and hyperphonate at the phrase/sentence level 3/5 opp w/ min cues over 3 consecutive sessions.   *pt overarticulates and hyperphonates at the phrase level in 3/5 opp w/ mod cues    7. Pt will increase rate of speech on connected speech 3/5 opp w/ min cues over 3 consecutive sessions.   *pt increases rate of speech in connected speech in 2/5 opp w/ mod cues    8. Pt will self-monitor and correct rate of speech 3/5 opp over 3 consecutive sessions.     *pt self-monitors and corrects rate of speech in 1/5 opp w/ mod cues    *goals may be added/modified pending progress towards     Thank you-  Mary Leslie M.A., [facility]-SLP        OP SLP Education     Row Name 02/08/19 1317       Education    Education Comments  d/w pt progress towards goals and recommendations for practice at home. She verbalizes understanding and agreement.   -KIRK      User Key  (r) = Recorded By, (t) = Taken By, (c) = Cosigned By    Initials Name Effective Dates    Mary Dave CCC-SLP 05/14/18 -           Time Calculation:   SLP Start Time: 1130  SLP Stop Time: 1200  SLP Time Calculation (min): 30 min  SLP Non-Billable Time (min): 30 min    Therapy Charges for Today     Code Description Service Date Service Provider Modifiers Qty    80747459802 HC ST CARE PLAN 15 MIN 2/8/2019 Mary Leslie CCC-SLP GN 2    [id number] HC ST TREATMENT SPEECH 2 2/8/2019 Mary Leslie CCC-SLP GN 1          Mary Leslie CCC-ESTER  2/8/2019

## 2019-05-02 ENCOUNTER — APPOINTMENT (OUTPATIENT)
Dept: GENERAL RADIOLOGY | Facility: HOSPITAL | Age: 44
End: 2019-05-02

## 2019-05-02 ENCOUNTER — HOSPITAL ENCOUNTER (EMERGENCY)
Facility: HOSPITAL | Age: 44
Discharge: HOME | End: 2019-05-02
Attending: EMERGENCY MEDICINE | Admitting: EMERGENCY MEDICINE

## 2019-05-02 VITALS
HEART RATE: 64 BPM | OXYGEN SATURATION: 98 % | RESPIRATION RATE: 16 BRPM | HEIGHT: 64 IN | SYSTOLIC BLOOD PRESSURE: 130 MMHG | DIASTOLIC BLOOD PRESSURE: 73 MMHG | WEIGHT: 273 LBS | BODY MASS INDEX: 46.61 KG/M2 | TEMPERATURE: 97.9 F

## 2019-05-02 DIAGNOSIS — I48.92 ATRIAL FLUTTER, UNSPECIFIED TYPE (HCC): ICD-10-CM

## 2019-05-02 DIAGNOSIS — R07.9 CHEST PAIN, UNSPECIFIED TYPE: Primary | ICD-10-CM

## 2019-05-02 LAB
ALBUMIN SERPL-MCNC: 3.83 G/DL (ref 3.5–5.2)
ALBUMIN/GLOB SERPL: 1 G/DL
ALP SERPL-CCNC: 112 U/L (ref 39–117)
ALT SERPL W P-5'-P-CCNC: 35 U/L (ref 1–33)
AMYLASE SERPL-CCNC: 64 U/L (ref 28–100)
ANION GAP SERPL CALCULATED.3IONS-SCNC: 18.5 MMOL/L
APTT PPP: 23.9 SECONDS (ref 23.8–36.1)
AST SERPL-CCNC: 35 U/L (ref 1–32)
BASOPHILS # BLD AUTO: 0.05 10*3/MM3 (ref 0–0.2)
BASOPHILS NFR BLD AUTO: 0.4 % (ref 0–1.5)
BILIRUB SERPL-MCNC: 0.3 MG/DL (ref 0.2–1.2)
BUN BLD-MCNC: 14 MG/DL (ref 6–20)
BUN/CREAT SERPL: 16.1 (ref 7–25)
CALCIUM SPEC-SCNC: 9.5 MG/DL (ref 8.6–10.5)
CHLORIDE SERPL-SCNC: 99 MMOL/L (ref 98–107)
CO2 SERPL-SCNC: 16.5 MMOL/L (ref 22–29)
CREAT BLD-MCNC: 0.87 MG/DL (ref 0.57–1)
DEPRECATED RDW RBC AUTO: 44.7 FL (ref 37–54)
EOSINOPHIL # BLD AUTO: 0.09 10*3/MM3 (ref 0–0.4)
EOSINOPHIL NFR BLD AUTO: 0.7 % (ref 0.3–6.2)
ERYTHROCYTE [DISTWIDTH] IN BLOOD BY AUTOMATED COUNT: 13.7 % (ref 12.3–15.4)
GFR SERPL CREATININE-BSD FRML MDRD: 71 ML/MIN/1.73
GLOBULIN UR ELPH-MCNC: 4 GM/DL
GLUCOSE BLD-MCNC: 199 MG/DL (ref 65–99)
HCG SERPL QL: NEGATIVE
HCT VFR BLD AUTO: 47.5 % (ref 34–46.6)
HGB BLD-MCNC: 15 G/DL (ref 12–15.9)
HOLD SPECIMEN: NORMAL
IMM GRANULOCYTES # BLD AUTO: 0.02 10*3/MM3 (ref 0–0.05)
IMM GRANULOCYTES NFR BLD AUTO: 0.2 % (ref 0–0.5)
INR PPP: 1.29 (ref 0.9–1.1)
LIPASE SERPL-CCNC: 42 U/L (ref 13–60)
LYMPHOCYTES # BLD AUTO: 1.65 10*3/MM3 (ref 0.7–3.1)
LYMPHOCYTES NFR BLD AUTO: 12.7 % (ref 19.6–45.3)
MAGNESIUM SERPL-MCNC: 2 MG/DL (ref 1.6–2.6)
MCH RBC QN AUTO: 28.3 PG (ref 26.6–33)
MCHC RBC AUTO-ENTMCNC: 31.6 G/DL (ref 31.5–35.7)
MCV RBC AUTO: 89.6 FL (ref 79–97)
MONOCYTES # BLD AUTO: 0.9 10*3/MM3 (ref 0.1–0.9)
MONOCYTES NFR BLD AUTO: 6.9 % (ref 5–12)
NEUTROPHILS # BLD AUTO: 10.3 10*3/MM3 (ref 1.7–7)
NEUTROPHILS NFR BLD AUTO: 79.1 % (ref 42.7–76)
NT-PROBNP SERPL-MCNC: 5120 PG/ML (ref 5–450)
PLATELET # BLD AUTO: 316 10*3/MM3 (ref 140–450)
PMV BLD AUTO: 12.1 FL (ref 6–12)
POTASSIUM BLD-SCNC: 4.6 MMOL/L (ref 3.5–5.2)
PROT SERPL-MCNC: 7.8 G/DL (ref 6–8.5)
PROTHROMBIN TIME: 16.3 SECONDS (ref 11–15.4)
RBC # BLD AUTO: 5.3 10*6/MM3 (ref 3.77–5.28)
SODIUM BLD-SCNC: 134 MMOL/L (ref 136–145)
TROPONIN T SERPL-MCNC: <0.01 NG/ML (ref 0–0.03)
TROPONIN T SERPL-MCNC: <0.01 NG/ML (ref 0–0.03)
TSH SERPL DL<=0.05 MIU/L-ACNC: 1.31 MIU/ML (ref 0.27–4.2)
WBC NRBC COR # BLD: 13.01 10*3/MM3 (ref 3.4–10.8)
WHOLE BLOOD HOLD SPECIMEN: NORMAL
WHOLE BLOOD HOLD SPECIMEN: NORMAL

## 2019-05-02 PROCEDURE — 96361 HYDRATE IV INFUSION ADD-ON: CPT

## 2019-05-02 PROCEDURE — 83735 ASSAY OF MAGNESIUM: CPT | Performed by: PHYSICIAN ASSISTANT

## 2019-05-02 PROCEDURE — 83880 ASSAY OF NATRIURETIC PEPTIDE: CPT | Performed by: PHYSICIAN ASSISTANT

## 2019-05-02 PROCEDURE — 36415 COLL VENOUS BLD VENIPUNCTURE: CPT

## 2019-05-02 PROCEDURE — 83690 ASSAY OF LIPASE: CPT | Performed by: PHYSICIAN ASSISTANT

## 2019-05-02 PROCEDURE — 80053 COMPREHEN METABOLIC PANEL: CPT | Performed by: EMERGENCY MEDICINE

## 2019-05-02 PROCEDURE — 84443 ASSAY THYROID STIM HORMONE: CPT | Performed by: PHYSICIAN ASSISTANT

## 2019-05-02 PROCEDURE — 71045 X-RAY EXAM CHEST 1 VIEW: CPT

## 2019-05-02 PROCEDURE — 85730 THROMBOPLASTIN TIME PARTIAL: CPT | Performed by: PHYSICIAN ASSISTANT

## 2019-05-02 PROCEDURE — 84703 CHORIONIC GONADOTROPIN ASSAY: CPT | Performed by: EMERGENCY MEDICINE

## 2019-05-02 PROCEDURE — 85025 COMPLETE CBC W/AUTO DIFF WBC: CPT | Performed by: EMERGENCY MEDICINE

## 2019-05-02 PROCEDURE — 93005 ELECTROCARDIOGRAM TRACING: CPT | Performed by: PHYSICIAN ASSISTANT

## 2019-05-02 PROCEDURE — 96360 HYDRATION IV INFUSION INIT: CPT

## 2019-05-02 PROCEDURE — 84484 ASSAY OF TROPONIN QUANT: CPT | Performed by: EMERGENCY MEDICINE

## 2019-05-02 PROCEDURE — 85610 PROTHROMBIN TIME: CPT | Performed by: PHYSICIAN ASSISTANT

## 2019-05-02 PROCEDURE — 93005 ELECTROCARDIOGRAM TRACING: CPT | Performed by: EMERGENCY MEDICINE

## 2019-05-02 PROCEDURE — 71045 X-RAY EXAM CHEST 1 VIEW: CPT | Performed by: RADIOLOGY

## 2019-05-02 PROCEDURE — 99284 EMERGENCY DEPT VISIT MOD MDM: CPT

## 2019-05-02 PROCEDURE — 82150 ASSAY OF AMYLASE: CPT | Performed by: PHYSICIAN ASSISTANT

## 2019-05-02 RX ORDER — SODIUM CHLORIDE 0.9 % (FLUSH) 0.9 %
10 SYRINGE (ML) INJECTION AS NEEDED
Status: DISCONTINUED | OUTPATIENT
Start: 2019-05-02 | End: 2019-05-02 | Stop reason: HOSPADM

## 2019-05-02 RX ORDER — SODIUM CHLORIDE 9 MG/ML
75 INJECTION, SOLUTION INTRAVENOUS CONTINUOUS
Status: DISCONTINUED | OUTPATIENT
Start: 2019-05-02 | End: 2019-05-02 | Stop reason: HOSPADM

## 2019-05-02 RX ORDER — ASPIRIN 325 MG
325 TABLET ORAL ONCE
Status: COMPLETED | OUTPATIENT
Start: 2019-05-02 | End: 2019-05-02

## 2019-05-02 RX ORDER — OXYCODONE AND ACETAMINOPHEN 10; 325 MG/1; MG/1
1 TABLET ORAL ONCE
Status: COMPLETED | OUTPATIENT
Start: 2019-05-02 | End: 2019-05-02

## 2019-05-02 RX ADMIN — OXYCODONE HYDROCHLORIDE AND ACETAMINOPHEN 1 TABLET: 10; 325 TABLET ORAL at 16:11

## 2019-05-02 RX ADMIN — SODIUM CHLORIDE 75 ML/HR: 9 INJECTION, SOLUTION INTRAVENOUS at 15:20

## 2019-05-02 RX ADMIN — ASPIRIN 325 MG: 325 TABLET ORAL at 15:18

## 2019-05-02 NOTE — ED PROVIDER NOTES
Subjective   43-year-old female presents the ED today for chest pain and shortness of breath.  She states this is been going on intermittently for the last 4 days.  She states she saw her primary care provider today who recommended that she come to the ER for an evaluation.  She states the pain is a pressure sensation in the center of her chest.  She states she also feels like her heart is racing.  She states she feels short of breath.  She states at times she feels dizzy.  She states she was recently treated for an ear and sinus infection.  She does report that she had a stroke in December.  She has a history of atrial fibrillation and is currently on Eliquis.  She states she has been taking all of her medications as prescribed.        History provided by:  Patient  Chest Pain   Pain location:  Substernal area  Pain quality: pressure    Pain radiates to:  Does not radiate  Pain severity:  Moderate  Onset quality:  Gradual  Duration:  4 days  Timing:  Intermittent  Progression:  Waxing and waning  Chronicity:  New  Context: at rest    Relieved by:  Nothing  Worsened by:  Nothing  Associated symptoms: dizziness, palpitations and shortness of breath    Associated symptoms: no abdominal pain, no altered mental status, no anorexia, no anxiety, no back pain, no claudication, no cough, no diaphoresis, no dysphagia, no fatigue, no fever, no headache, no heartburn, no lower extremity edema, no nausea, no near-syncope, no numbness, no orthopnea, no PND, no syncope, no vomiting and no weakness    Risk factors: diabetes mellitus and obesity        Review of Systems   Constitutional: Negative for diaphoresis, fatigue and fever.   HENT: Negative for trouble swallowing.    Eyes: Negative.    Respiratory: Positive for shortness of breath. Negative for cough.    Cardiovascular: Positive for chest pain and palpitations. Negative for orthopnea, claudication, syncope, PND and near-syncope.   Gastrointestinal: Negative for abdominal  pain, anorexia, heartburn, nausea and vomiting.   Genitourinary: Negative.    Musculoskeletal: Negative for back pain.   Skin: Negative.    Neurological: Positive for dizziness. Negative for weakness, numbness and headaches.   Psychiatric/Behavioral: Negative.    All other systems reviewed and are negative.      Past Medical History:   Diagnosis Date   • A-fib (CMS/HCC)    • Back pain    • CTS (carpal tunnel syndrome)    • GERD (gastroesophageal reflux disease)    • Gout    • Migraine    • Osteoarthritis    • Stroke (CMS/HCC)        Allergies   Allergen Reactions   • Ultram [Tramadol Hcl] Nausea And Vomiting   • Latex Hives   • Tape Hives       Past Surgical History:   Procedure Laterality Date   • CARPAL TUNNEL RELEASE Left    • CHOLECYSTECTOMY     • HAND SURGERY Left    • LEG SURGERY Left    • OVARIAN CYST SURGERY         Family History   Problem Relation Age of Onset   • Osteoarthritis Mother    • Heart disease Mother    • Hypertension Mother    • Osteoarthritis Father    • Hypertension Father    • Osteoarthritis Sister    • Rheum arthritis Sister    • Heart disease Sister    • Hypertension Sister    • Migraines Sister    • Osteoarthritis Brother    • Heart disease Brother    • Hypertension Brother    • Osteoarthritis Maternal Grandmother    • Heart disease Maternal Grandmother    • Hypertension Maternal Grandmother    • Osteoarthritis Maternal Grandfather    • Heart disease Maternal Grandfather    • Hypertension Maternal Grandfather        Social History     Socioeconomic History   • Marital status:      Spouse name: Not on file   • Number of children: Not on file   • Years of education: Not on file   • Highest education level: Not on file   Tobacco Use   • Smoking status: Never Smoker   • Smokeless tobacco: Never Used   Substance and Sexual Activity   • Alcohol use: No   • Drug use: No   • Sexual activity: Defer           Objective   Physical Exam   Constitutional: She is oriented to person, place, and  time. She appears well-developed and well-nourished. No distress.   HENT:   Head: Normocephalic and atraumatic.   Eyes: EOM are normal. Pupils are equal, round, and reactive to light.   Neck: Normal range of motion. Neck supple.   Cardiovascular: Normal rate, intact distal pulses and normal pulses. An irregularly irregular rhythm present.   Pulmonary/Chest: Effort normal and breath sounds normal.   Abdominal: Soft. Bowel sounds are normal.   Musculoskeletal: Normal range of motion.        Right lower leg: Normal.        Left lower leg: Normal.   Neurological: She is alert and oriented to person, place, and time.   Skin: Skin is warm and dry. Capillary refill takes less than 2 seconds.   Psychiatric: She has a normal mood and affect. Her behavior is normal.   Nursing note and vitals reviewed.      Procedures           ED Course  ED Course as of May 02 1827   Thu May 02, 2019   1536 14:26 - A. Fib, rate of 86, nonspecific ST-T wave abnormality, no acute ischemia, reviewed by Dr. Hernadez ECG 12 Lead []   1558 Patient reports she is having back and leg pain and it is time for her to take her prescribed Percocet 10 mg.  Medication ordered.  []   1717 16:46 - A Flutter, rate of 86, no acute ischemia, reviewed by Dr. Don ECG 12 Lead []   1717 Patient reports she is feeling better, symptoms resolved.  I offered admission for observation however she declines at this time and states she will follow up with her PCP tomorrow.  She will return to the ED if her symptoms change or worsen.  []      ED Course User Index  [] Maria Luz Lua PA                HEART Score (for prediction of 6-week risk of major adverse cardiac event) reviewed and/or performed as part of the patient evaluation and treatment planning process.  The result associated with this review/performance is: 3       MDM  Number of Diagnoses or Management Options  Atrial flutter, unspecified type (CMS/HCC):   Chest pain, unspecified type:      Amount  and/or Complexity of Data Reviewed  Clinical lab tests: reviewed  Tests in the radiology section of CPT®: reviewed  Tests in the medicine section of CPT®: reviewed    Patient Progress  Patient progress: improved        Final diagnoses:   Chest pain, unspecified type   Atrial flutter, unspecified type (CMS/HCC)            Maria Luz Lua PA  05/02/19 6760

## 2019-05-02 NOTE — ED NOTES
Pt complains of pain in back, legs, arms, provider made aware, new orders noted.     Kourtney Cleaning, RN  05/02/19 2765

## 2019-05-02 NOTE — ED NOTES
EKG completed at 1430. Shown to Dr. Hernadez @ [facility]     Siria Sauer  05/02/19 7594

## 2019-05-03 ENCOUNTER — EPISODE CHANGES (OUTPATIENT)
Dept: CASE MANAGEMENT | Facility: OTHER | Age: 44
End: 2019-05-03

## 2019-05-03 ENCOUNTER — PATIENT OUTREACH (OUTPATIENT)
Dept: CASE MANAGEMENT | Facility: OTHER | Age: 44
End: 2019-05-03

## 2019-05-03 NOTE — OUTREACH NOTE
Care Plan Note    Care Management Plan 5/3/2019   Lifestyle Goals Eat a healthy diet;Fewer ER/urgent care visits;Fewer exacerbations;Self monitor blood sugar;Self monitor blood pressure;Routine eye exam;Routine foot care;Routine follow-up with doctor(s)   Barriers Disease education   Self Management Home BP Monitoring;Home Glucose Monitoring;Medication Adherence   Annual Wellness Visit:  Patient Will Schedule   Care Gaps Addressed Diabetic Eye Exam   Specific Disease Process Teaching Heart Disease   Specific Disease Process Teaching a fib/flutter   Does patient have depression diagnosis? No   Advanced Directives: Not Interested At This Time   Medication Adherence Medications understood   Health Literacy Good     The main concerns and/or symptoms the patient would like to address are: atrial fibrillation/flutter.    Education/instruction provided by Care Coordinator: Patient reports she saw her PCP this morning and he told her she is still in atrial fibrillation/flutter. Patient reports he is scheduling her for an MRI of her brain to make sure she has not had another stroke. Patient denies any current chest pain. CC advised pt to be sure she takes her anticoagulant as prescribed; educated on risk of stroke with a-fib, pt voiced understanding, stated her doctor told her the same. CC went over s/s stroke, advised pt to seek emergency medical attention if noted. Pt confirms she will have her  there with her all weekend.     Follow Up Outreach Due: 1 week    Courtney Jay RN    5/3/2019, 1:46 PM

## 2019-05-07 ENCOUNTER — TRANSCRIBE ORDERS (OUTPATIENT)
Dept: ADMINISTRATIVE | Facility: HOSPITAL | Age: 44
End: 2019-05-07

## 2019-05-07 DIAGNOSIS — I63.9 CEREBRAL INFARCTION, UNSPECIFIED MECHANISM (HCC): Primary | ICD-10-CM

## 2019-05-09 ENCOUNTER — APPOINTMENT (OUTPATIENT)
Dept: MRI IMAGING | Facility: HOSPITAL | Age: 44
End: 2019-05-09

## 2019-05-09 ENCOUNTER — HOSPITAL ENCOUNTER (OUTPATIENT)
Dept: MRI IMAGING | Facility: HOSPITAL | Age: 44
Discharge: HOME | End: 2019-05-09
Admitting: FAMILY MEDICINE

## 2019-05-09 DIAGNOSIS — I63.9 CEREBRAL INFARCTION, UNSPECIFIED MECHANISM (HCC): ICD-10-CM

## 2019-05-09 PROCEDURE — 70551 MRI BRAIN STEM W/O DYE: CPT

## 2019-05-09 PROCEDURE — 70551 MRI BRAIN STEM W/O DYE: CPT | Performed by: RADIOLOGY

## 2019-05-15 ENCOUNTER — EPISODE CHANGES (OUTPATIENT)
Dept: CASE MANAGEMENT | Facility: OTHER | Age: 44
End: 2019-05-15

## 2019-05-20 ENCOUNTER — HOSPITAL ENCOUNTER (OUTPATIENT)
Dept: CARDIOLOGY | Facility: HOSPITAL | Age: 44
Discharge: HOME | End: 2019-05-20
Attending: INTERNAL MEDICINE | Admitting: INTERNAL MEDICINE

## 2019-05-20 VITALS
WEIGHT: 275.8 LBS | SYSTOLIC BLOOD PRESSURE: 112 MMHG | BODY MASS INDEX: 47.08 KG/M2 | RESPIRATION RATE: 16 BRPM | HEART RATE: 60 BPM | DIASTOLIC BLOOD PRESSURE: 66 MMHG | TEMPERATURE: 97.5 F | OXYGEN SATURATION: 99 % | HEIGHT: 64 IN

## 2019-05-20 DIAGNOSIS — I48.91 ATRIAL FIBRILLATION, UNSPECIFIED TYPE (HCC): ICD-10-CM

## 2019-05-20 LAB
ANION GAP SERPL CALCULATED.3IONS-SCNC: 10 MMOL/L
BUN BLD-MCNC: 14 MG/DL (ref 6–20)
BUN/CREAT SERPL: 15.9 (ref 7–25)
CALCIUM SPEC-SCNC: 8.9 MG/DL (ref 8.6–10.5)
CHLORIDE SERPL-SCNC: 100 MMOL/L (ref 98–107)
CO2 SERPL-SCNC: 24 MMOL/L (ref 22–29)
CREAT BLD-MCNC: 0.88 MG/DL (ref 0.57–1)
DEPRECATED RDW RBC AUTO: 42.6 FL (ref 37–54)
ERYTHROCYTE [DISTWIDTH] IN BLOOD BY AUTOMATED COUNT: 13.2 % (ref 12.3–15.4)
GFR SERPL CREATININE-BSD FRML MDRD: 70 ML/MIN/1.73
GLUCOSE BLD-MCNC: 217 MG/DL (ref 65–99)
HCT VFR BLD AUTO: 38.4 % (ref 34–46.6)
HGB BLD-MCNC: 12.4 G/DL (ref 12–15.9)
MCH RBC QN AUTO: 28.3 PG (ref 26.6–33)
MCHC RBC AUTO-ENTMCNC: 32.3 G/DL (ref 31.5–35.7)
MCV RBC AUTO: 87.7 FL (ref 79–97)
PLATELET # BLD AUTO: 309 10*3/MM3 (ref 140–450)
PMV BLD AUTO: 11.4 FL (ref 6–12)
POTASSIUM BLD-SCNC: 4.4 MMOL/L (ref 3.5–5.2)
RBC # BLD AUTO: 4.38 10*6/MM3 (ref 3.77–5.28)
SODIUM BLD-SCNC: 134 MMOL/L (ref 136–145)
WBC NRBC COR # BLD: 11.87 10*3/MM3 (ref 3.4–10.8)

## 2019-05-20 PROCEDURE — 99152 MOD SED SAME PHYS/QHP 5/>YRS: CPT

## 2019-05-20 PROCEDURE — 85027 COMPLETE CBC AUTOMATED: CPT | Performed by: INTERNAL MEDICINE

## 2019-05-20 PROCEDURE — 80048 BASIC METABOLIC PNL TOTAL CA: CPT | Performed by: INTERNAL MEDICINE

## 2019-05-20 PROCEDURE — 93005 ELECTROCARDIOGRAM TRACING: CPT | Performed by: INTERNAL MEDICINE

## 2019-05-20 PROCEDURE — 36415 COLL VENOUS BLD VENIPUNCTURE: CPT

## 2019-05-20 PROCEDURE — 92960 CARDIOVERSION ELECTRIC EXT: CPT

## 2019-05-20 PROCEDURE — 25010000002 MIDAZOLAM PER 1 MG: Performed by: INTERNAL MEDICINE

## 2019-05-20 RX ORDER — FLECAINIDE ACETATE 50 MG/1
50 TABLET ORAL 2 TIMES DAILY
COMMUNITY
End: 2020-01-28

## 2019-05-20 RX ORDER — FLUMAZENIL 0.1 MG/ML
INJECTION INTRAVENOUS
Status: DISCONTINUED
Start: 2019-05-20 | End: 2019-05-20 | Stop reason: HOSPADM

## 2019-05-20 RX ORDER — FENTANYL CITRATE 50 UG/ML
INJECTION, SOLUTION INTRAMUSCULAR; INTRAVENOUS
Status: DISCONTINUED
Start: 2019-05-20 | End: 2019-05-20 | Stop reason: WASHOUT

## 2019-05-20 RX ORDER — MIDAZOLAM HYDROCHLORIDE 1 MG/ML
INJECTION INTRAMUSCULAR; INTRAVENOUS
Status: DISCONTINUED
Start: 2019-05-20 | End: 2019-05-20 | Stop reason: HOSPADM

## 2019-05-20 RX ORDER — NALOXONE HYDROCHLORIDE 0.4 MG/ML
INJECTION, SOLUTION INTRAMUSCULAR; INTRAVENOUS; SUBCUTANEOUS
Status: DISCONTINUED
Start: 2019-05-20 | End: 2019-05-20 | Stop reason: WASHOUT

## 2019-05-20 RX ORDER — FLUMAZENIL 0.1 MG/ML
INJECTION INTRAVENOUS
Status: DISCONTINUED
Start: 2019-05-20 | End: 2019-05-20 | Stop reason: WASHOUT

## 2019-05-20 RX ORDER — MIDAZOLAM HYDROCHLORIDE 1 MG/ML
INJECTION INTRAMUSCULAR; INTRAVENOUS
Status: COMPLETED | OUTPATIENT
Start: 2019-05-20 | End: 2019-05-20

## 2019-05-20 RX ADMIN — METHOHEXITAL SODIUM 10 MG: 500 INJECTION, POWDER, LYOPHILIZED, FOR SOLUTION INTRAMUSCULAR; INTRAVENOUS; RECTAL at 13:30

## 2019-05-20 RX ADMIN — MIDAZOLAM HYDROCHLORIDE 2 MG: 1 INJECTION, SOLUTION INTRAMUSCULAR; INTRAVENOUS at 13:27

## 2019-05-20 RX ADMIN — METHOHEXITAL SODIUM 20 MG: 500 INJECTION, POWDER, LYOPHILIZED, FOR SOLUTION INTRAMUSCULAR; INTRAVENOUS; RECTAL at 13:28

## 2019-05-20 NOTE — H&P
Pre-Cardiac Catheterization Report  Cardiovascular Laboratory  [facility]      Patient:  Madelyn Velazquez  :  1975  PCP:  Hebert Butt MD  PHONE:  [phone]    DATE: 2019    BRIEF HPI:  Madelyn Velazquez is a 43 y.o. female with hypertension, CVA, and atrial fibrillation.  She was evaluated in the office today and was complaining of shortness of breath that is occurring daily.  Associated symptoms included some nausea.  She says that her symptoms increased with any activity.  She is chronically anticoagulated with Eliquis.  She now presents for external cardioversion.    Cardiac Risk Factors:  dyslipidemia, family history of premature cardiovascular disease, hypertension, obesity (BMI >= 30 kg/m2)    Anginal class in last 2 weeks:  No anginal symptoms    CHF Class in last 2 weeks:  NYHA Class II    Cardiogenic shock:  no    Cardiac arrest <24 hours:  no    Stress test within last 6 months:   yes   Details:    Previous cardiac catheterization:  no  Details:     Previous CABG:  no  Details:      Allergies:     IV contrast allergy:  no  Allergies   Allergen Reactions   • Ultram [Tramadol Hcl] Nausea And Vomiting   • Latex Hives   • Tape Hives       MEDICATIONS:  Prior to Admission medications    Medication Sig Start Date End Date Taking? Authorizing Provider   allopurinol (ZYLOPRIM) 100 MG tablet Take 1 tablet by mouth Daily. 10/31/18   Provider, MD Eulalia   apixaban (ELIQUIS) 5 MG tablet tablet Take 1 tablet by mouth Every 12 (Twelve) Hours. 19   Hiren Tinajero MD   aspirin 81 MG chewable tablet Chew 1 tablet Daily. 19   Lia Mullins APRN   atorvastatin (LIPITOR) 80 MG tablet Take 1 tablet by mouth Every Night. 19   Lia Mullins APRN   cholecalciferol (VITAMIN D3) 400 units tablet Take 400 Units by mouth Daily.    Provider, MD Eulalia   diltiaZEM CD (CARDIZEM CD) 180 MG 24 hr capsule Take 1 capsule by mouth Daily. 19   Hiren Tinajero  MD   gabapentin (NEURONTIN) 800 MG tablet Take 800 mg by mouth 3 (Three) Times a Day.    ProviderEulalia MD   HYDROcodone-acetaminophen (NORCO)  MG per tablet Take 1 tablet by mouth Every 8 (Eight) Hours. 8/1/18   Eulalia Mina MD   omeprazole (PRILOSEC) 20 MG capsule Take 40 mg by mouth. 2/24/15   Eulalia Mina MD   oxyCODONE-acetaminophen (PERCOCET)  MG per tablet Take 1 tablet by mouth Every 8 (Eight) Hours. 1/25/19   Eulalia Mina MD   tiZANidine (ZANAFLEX) 2 MG tablet Take 1 tablet by mouth Every 8 (Eight) Hours. 8/1/18   Eulalia Mina MD       Past medical & surgical history, social and family history reviewed in the electronic medical record.    ROS:  Cardiovascular ROS: positive for - irregular heartbeat, palpitations and shortness of breath    Physical Exam:    Vitals: There were no vitals filed for this visit. There were no vitals filed for this visit.    General Appearance:    Alert, cooperative, in no acute distress   Head:    Normocephalic, without obvious abnormality, atraumatic   Eyes:            Lids and lashes normal, conjunctivae and sclerae normal, no   icterus, no pallor, corneas clear, PERRLA   Ears:    Ears appear intact with no abnormalities noted   Neck:   No adenopathy, supple, trachea midline, no thyromegaly, no   carotid bruit, no JVD   Back:     No kyphosis present, no scoliosis present, range of motion normal   Lungs:     Clear to auscultation,respirations regular, even and                  unlabored    Heart:    irregular rhythm and normal rate, normal S1 and S2, no            murmur, no gallop, no rub, no click   Chest Wall:    No abnormalities observed   Abdomen:     Normal bowel sounds, no masses, no organomegaly, soft        non-tender, non-distended, no guarding, no rebound                tenderness   Rectal:     Deferred   Extremities:   Moves all extremities well, no edema, no cyanosis, no             redness   Pulses:   Pulses  palpable and equal bilaterally   Skin:   No bleeding, bruising or rash   Neurologic:   Cranial nerves 2 - 12 grossly intact, sensation intact     Barbaeu Test:  Left: Not Assessed  (oxymetric Allens) Right: Not Assessed             Lab Results   Component Value Date    TRIG 168 (H) 12/31/2018    HDL 40 12/31/2018    AST 35 (H) 05/02/2019    ALT 35 (H) 05/02/2019           Impression      · Persistent atrial fibrillation    Plan     · Procedure to perform: External cardioversion                DENNY Gonzalez  05/20/19  12:20 PM

## 2019-05-29 ENCOUNTER — EPISODE CHANGES (OUTPATIENT)
Dept: CASE MANAGEMENT | Facility: OTHER | Age: 44
End: 2019-05-29

## 2019-07-02 ENCOUNTER — EPISODE CHANGES (OUTPATIENT)
Dept: CASE MANAGEMENT | Facility: OTHER | Age: 44
End: 2019-07-02

## 2019-08-02 ENCOUNTER — HOSPITAL ENCOUNTER (OUTPATIENT)
Dept: MAMMOGRAPHY | Facility: HOSPITAL | Age: 44
Discharge: HOME | End: 2019-08-02
Admitting: FAMILY MEDICINE

## 2019-08-02 DIAGNOSIS — Z12.39 SCREENING BREAST EXAMINATION: ICD-10-CM

## 2019-08-02 PROCEDURE — 77063 BREAST TOMOSYNTHESIS BI: CPT | Performed by: RADIOLOGY

## 2019-08-02 PROCEDURE — 77067 SCR MAMMO BI INCL CAD: CPT | Performed by: RADIOLOGY

## 2019-08-02 PROCEDURE — 77067 SCR MAMMO BI INCL CAD: CPT

## 2019-08-02 PROCEDURE — 77063 BREAST TOMOSYNTHESIS BI: CPT

## 2019-12-19 ENCOUNTER — HOSPITAL ENCOUNTER (EMERGENCY)
Facility: HOSPITAL | Age: 44
Discharge: HOME | End: 2019-12-19
Attending: FAMILY MEDICINE | Admitting: FAMILY MEDICINE

## 2019-12-19 ENCOUNTER — APPOINTMENT (OUTPATIENT)
Dept: GENERAL RADIOLOGY | Facility: HOSPITAL | Age: 44
End: 2019-12-19

## 2019-12-19 ENCOUNTER — PATIENT OUTREACH (OUTPATIENT)
Dept: CASE MANAGEMENT | Facility: OTHER | Age: 44
End: 2019-12-19

## 2019-12-19 VITALS
HEIGHT: 64 IN | WEIGHT: 270 LBS | SYSTOLIC BLOOD PRESSURE: 139 MMHG | TEMPERATURE: 98.2 F | OXYGEN SATURATION: 99 % | DIASTOLIC BLOOD PRESSURE: 88 MMHG | RESPIRATION RATE: 16 BRPM | BODY MASS INDEX: 46.1 KG/M2 | HEART RATE: 81 BPM

## 2019-12-19 DIAGNOSIS — S93.402A SPRAIN OF LEFT ANKLE, UNSPECIFIED LIGAMENT, INITIAL ENCOUNTER: Primary | ICD-10-CM

## 2019-12-19 PROCEDURE — 73590 X-RAY EXAM OF LOWER LEG: CPT

## 2019-12-19 PROCEDURE — 73610 X-RAY EXAM OF ANKLE: CPT

## 2019-12-19 PROCEDURE — 73590 X-RAY EXAM OF LOWER LEG: CPT | Performed by: RADIOLOGY

## 2019-12-19 PROCEDURE — 73610 X-RAY EXAM OF ANKLE: CPT | Performed by: RADIOLOGY

## 2019-12-19 PROCEDURE — 99283 EMERGENCY DEPT VISIT LOW MDM: CPT

## 2019-12-19 RX ORDER — HYDROCODONE BITARTRATE AND ACETAMINOPHEN 5; 325 MG/1; MG/1
1 TABLET ORAL EVERY 6 HOURS PRN
Qty: 8 TABLET | Refills: 0 | Status: SHIPPED | OUTPATIENT
Start: 2019-12-19 | End: 2020-01-28

## 2019-12-19 RX ORDER — ORPHENADRINE CITRATE 100 MG/1
100 TABLET, EXTENDED RELEASE ORAL 2 TIMES DAILY PRN
Qty: 20 TABLET | Refills: 0 | Status: SHIPPED | OUTPATIENT
Start: 2019-12-19 | End: 2022-10-13

## 2019-12-19 RX ORDER — HYDROCODONE BITARTRATE AND ACETAMINOPHEN 7.5; 325 MG/1; MG/1
1 TABLET ORAL ONCE
Status: COMPLETED | OUTPATIENT
Start: 2019-12-19 | End: 2019-12-19

## 2019-12-19 RX ADMIN — HYDROCODONE BITARTRATE AND ACETAMINOPHEN 1 TABLET: 7.5; 325 TABLET ORAL at 16:31

## 2019-12-19 NOTE — OUTREACH NOTE
Care Coordination Note    Inpatient CM notified of the following:    RE:  MRN [id number]    Acute activity alert via Patient PING.  Patient eligible for HRCM services.  Patient presented to [facility] ED.  RN-Ambulatory  available for assistance as needed and for follow up post discharge.      RN-ACM can be reached at [phone]. If patient is not admitted, RN-ACM will attempt outreach to patient telephonically in 1-3 business days.    Courtney Jay RN  Ambulatory     12/19/2019, 4:21 PM

## 2019-12-19 NOTE — ED PROVIDER NOTES
Subjective     History provided by:  Patient  Fall   Mechanism of injury: fall    Injury location:  Leg  Leg injury location:  L ankle and L lower leg  Incident location:  Home  Time since incident:  1 day  Arrived directly from scene: no    Fall:     Fall occurred: slipped on porch.  Prior to arrival data:     Loss of consciousness: no      Amnesic to event: no    Associated symptoms: no abdominal pain and no chest pain        Review of Systems   Constitutional: Negative.  Negative for fever.   HENT: Negative.    Respiratory: Negative.    Cardiovascular: Negative.  Negative for chest pain.   Gastrointestinal: Negative.  Negative for abdominal pain.   Endocrine: Negative.    Genitourinary: Negative.  Negative for dysuria.   Musculoskeletal: Positive for arthralgias.   Skin: Negative.    Neurological: Negative.    Psychiatric/Behavioral: Negative.    All other systems reviewed and are negative.      Past Medical History:   Diagnosis Date   • A-fib (CMS/HCC)    • Arrhythmia     ATRIAL FIB   • Back pain    • CTS (carpal tunnel syndrome)    • GERD (gastroesophageal reflux disease)    • Gout    • Migraine    • Osteoarthritis    • Stroke (CMS/[facility])     DEC 30 2018       Allergies   Allergen Reactions   • Ultram [Tramadol Hcl] Nausea And Vomiting   • Latex Hives   • Tape Hives   • Metformin Rash       Past Surgical History:   Procedure Laterality Date   • CARPAL TUNNEL RELEASE Left    • CHOLECYSTECTOMY     • HAND SURGERY Left    • LEG SURGERY Left    • OVARIAN CYST SURGERY         Family History   Problem Relation Age of Onset   • Osteoarthritis Mother    • Heart disease Mother    • Hypertension Mother    • Osteoarthritis Father    • Hypertension Father    • Osteoarthritis Sister    • Rheum arthritis Sister    • Heart disease Sister    • Hypertension Sister    • Migraines Sister    • Osteoarthritis Brother    • Heart disease Brother    • Hypertension Brother    • Osteoarthritis Maternal Grandmother    • Heart disease  Maternal Grandmother    • Hypertension Maternal Grandmother    • Osteoarthritis Maternal Grandfather    • Heart disease Maternal Grandfather    • Hypertension Maternal Grandfather    • Breast cancer Neg Hx        Social History     Socioeconomic History   • Marital status:      Spouse name: Not on file   • Number of children: Not on file   • Years of education: Not on file   • Highest education level: Not on file   Tobacco Use   • Smoking status: Never Smoker   • Smokeless tobacco: Never Used   Substance and Sexual Activity   • Alcohol use: No   • Drug use: No   • Sexual activity: Defer           Objective   Physical Exam   Constitutional: She is oriented to person, place, and time. She appears well-developed and well-nourished. No distress.   HENT:   Head: Normocephalic and atraumatic.   Right Ear: External ear normal.   Left Ear: External ear normal.   Nose: Nose normal.   Eyes: Conjunctivae are normal.   Neck: Normal range of motion. Neck supple. No JVD present. No tracheal deviation present.   Cardiovascular: Normal rate.   No murmur heard.  Pulmonary/Chest: Effort normal. No respiratory distress. She has no wheezes.   Abdominal: Soft. There is no tenderness.   Musculoskeletal: She exhibits edema and tenderness. She exhibits no deformity.   Moderate edema of the left ankle.  Montalvo test was negative.  There is tenderness palpation of the left calf muscle.  Previous surgical scarring from surgical repair of left ankle 7 years prior to arrival.   Neurological: She is alert and oriented to person, place, and time. No cranial nerve deficit.   Skin: Skin is warm and dry. No rash noted. She is not diaphoretic. No erythema. No pallor.   Psychiatric: She has a normal mood and affect. Her behavior is normal. Thought content normal.   Nursing note and vitals reviewed.      Procedures           ED Course  ED Course as of Dec 19 1753   Thu Dec 19, 2019   1743 XR tib / fib rad interpreted:  No acute bony abnormality      [RB]   1743 XR ankle rad interpreted:  No acute fracture     [RB]      ED Course User Index  [RB] Nikhil Murphy PA                      No data recorded                        MDM  Number of Diagnoses or Management Options  Sprain of left ankle, unspecified ligament, initial encounter: new and requires workup     Amount and/or Complexity of Data Reviewed  Tests in the radiology section of CPT®: ordered and reviewed    Risk of Complications, Morbidity, and/or Mortality  Presenting problems: low  Diagnostic procedures: low  Management options: low    Patient Progress  Patient progress: stable      Final diagnoses:   Sprain of left ankle, unspecified ligament, initial encounter              Nikhil Murphy PA  12/19/19 1755

## 2019-12-19 NOTE — ED NOTES
Patient states she slipped on ice on her porch and fell down the steps. Patient states she twisted her left ankle/foot and it popped twice. Patient states she did not hit her head and did not have any loss of consciousness. Patient's cap refill is less than 3 sec and pulse is 2+ in her left foot.      Benita Ramesh RN  12/19/19 9521

## 2019-12-20 ENCOUNTER — PATIENT OUTREACH (OUTPATIENT)
Dept: CASE MANAGEMENT | Facility: OTHER | Age: 44
End: 2019-12-20

## 2019-12-20 NOTE — OUTREACH NOTE
Patient Outreach Note    Patient was seen in ED yesterday for ankle sprain; patient reports feeling OK today but very sore. Patient confirms she is keeping elevated and applying ice; RN-ACM discussed AVS instructions, pt voiced understanding. RN-ACM educ on Norlfex which pt had not take before, cautioned pt about risk of driving while taking this and pain medication, pt voiced understanding. Pt has taken the pain meds previously, denies questions, RN-ACM reminded pt of risk of constipation and encouraged her to increase hydration/fiber, pt v/u. Pt states she will call and schedule with Dr. Andres as instructed.    Courtney Jay RN  Ambulatory      12/20/2019, 2:22 PM

## 2020-01-28 ENCOUNTER — HOSPITAL ENCOUNTER (INPATIENT)
Facility: HOSPITAL | Age: 45
LOS: 3 days | Discharge: HOME | End: 2020-01-31
Attending: INTERNAL MEDICINE | Admitting: INTERNAL MEDICINE

## 2020-01-28 PROBLEM — I48.91 ATRIAL FIBRILLATION: Status: ACTIVE | Noted: 2020-01-28

## 2020-01-28 LAB
ANION GAP SERPL CALCULATED.3IONS-SCNC: 14 MMOL/L (ref 5–15)
BUN BLD-MCNC: 15 MG/DL (ref 6–20)
BUN/CREAT SERPL: 16.1 (ref 7–25)
CALCIUM SPEC-SCNC: 9.3 MG/DL (ref 8.6–10.5)
CHLORIDE SERPL-SCNC: 100 MMOL/L (ref 98–107)
CO2 SERPL-SCNC: 22 MMOL/L (ref 22–29)
CREAT BLD-MCNC: 0.93 MG/DL (ref 0.57–1)
DEPRECATED RDW RBC AUTO: 47.1 FL (ref 37–54)
ERYTHROCYTE [DISTWIDTH] IN BLOOD BY AUTOMATED COUNT: 13.8 % (ref 12.3–15.4)
GFR SERPL CREATININE-BSD FRML MDRD: 65 ML/MIN/1.73
GLUCOSE BLD-MCNC: 250 MG/DL (ref 65–99)
GLUCOSE BLDC GLUCOMTR-MCNC: 251 MG/DL (ref 70–130)
GLUCOSE BLDC GLUCOMTR-MCNC: 335 MG/DL (ref 70–130)
HCT VFR BLD AUTO: 44.3 % (ref 34–46.6)
HGB BLD-MCNC: 13.7 G/DL (ref 12–15.9)
MAGNESIUM SERPL-MCNC: 1.8 MG/DL (ref 1.6–2.6)
MCH RBC QN AUTO: 29.1 PG (ref 26.6–33)
MCHC RBC AUTO-ENTMCNC: 30.9 G/DL (ref 31.5–35.7)
MCV RBC AUTO: 94.1 FL (ref 79–97)
PLATELET # BLD AUTO: 270 10*3/MM3 (ref 140–450)
PMV BLD AUTO: 11.5 FL (ref 6–12)
POTASSIUM BLD-SCNC: 4.2 MMOL/L (ref 3.5–5.2)
RBC # BLD AUTO: 4.71 10*6/MM3 (ref 3.77–5.28)
SODIUM BLD-SCNC: 136 MMOL/L (ref 136–145)
WBC NRBC COR # BLD: 9.75 10*3/MM3 (ref 3.4–10.8)

## 2020-01-28 PROCEDURE — 80048 BASIC METABOLIC PNL TOTAL CA: CPT | Performed by: INTERNAL MEDICINE

## 2020-01-28 PROCEDURE — 85027 COMPLETE CBC AUTOMATED: CPT | Performed by: INTERNAL MEDICINE

## 2020-01-28 PROCEDURE — 93005 ELECTROCARDIOGRAM TRACING: CPT | Performed by: INTERNAL MEDICINE

## 2020-01-28 PROCEDURE — 83735 ASSAY OF MAGNESIUM: CPT | Performed by: INTERNAL MEDICINE

## 2020-01-28 PROCEDURE — G0378 HOSPITAL OBSERVATION PER HR: HCPCS

## 2020-01-28 PROCEDURE — 82962 GLUCOSE BLOOD TEST: CPT

## 2020-01-28 RX ORDER — ORPHENADRINE CITRATE 100 MG/1
100 TABLET, EXTENDED RELEASE ORAL EVERY 12 HOURS PRN
Status: DISCONTINUED | OUTPATIENT
Start: 2020-01-28 | End: 2020-01-31 | Stop reason: HOSPADM

## 2020-01-28 RX ORDER — ATORVASTATIN CALCIUM 40 MG/1
80 TABLET, FILM COATED ORAL NIGHTLY
Status: DISCONTINUED | OUTPATIENT
Start: 2020-01-28 | End: 2020-01-31 | Stop reason: HOSPADM

## 2020-01-28 RX ORDER — PANTOPRAZOLE SODIUM 40 MG/1
40 TABLET, DELAYED RELEASE ORAL
Status: DISCONTINUED | OUTPATIENT
Start: 2020-01-28 | End: 2020-01-28

## 2020-01-28 RX ORDER — CLONAZEPAM 0.5 MG/1
0.5 TABLET ORAL 2 TIMES DAILY
Status: DISCONTINUED | OUTPATIENT
Start: 2020-01-28 | End: 2020-01-31 | Stop reason: HOSPADM

## 2020-01-28 RX ORDER — POTASSIUM CHLORIDE 1.5 G/1.77G
40 POWDER, FOR SOLUTION ORAL AS NEEDED
Status: DISCONTINUED | OUTPATIENT
Start: 2020-01-28 | End: 2020-01-31 | Stop reason: HOSPADM

## 2020-01-28 RX ORDER — OXYCODONE AND ACETAMINOPHEN 10; 325 MG/1; MG/1
1 TABLET ORAL EVERY 8 HOURS
Status: DISCONTINUED | OUTPATIENT
Start: 2020-01-28 | End: 2020-01-31 | Stop reason: HOSPADM

## 2020-01-28 RX ORDER — MAGNESIUM SULFATE HEPTAHYDRATE 40 MG/ML
4 INJECTION, SOLUTION INTRAVENOUS AS NEEDED
Status: DISCONTINUED | OUTPATIENT
Start: 2020-01-28 | End: 2020-01-31 | Stop reason: HOSPADM

## 2020-01-28 RX ORDER — MAGNESIUM SULFATE HEPTAHYDRATE 40 MG/ML
2 INJECTION, SOLUTION INTRAVENOUS AS NEEDED
Status: DISCONTINUED | OUTPATIENT
Start: 2020-01-28 | End: 2020-01-31 | Stop reason: HOSPADM

## 2020-01-28 RX ORDER — MAGNESIUM SULFATE HEPTAHYDRATE 40 MG/ML
2 INJECTION, SOLUTION INTRAVENOUS AS NEEDED
Status: DISCONTINUED | OUTPATIENT
Start: 2020-01-28 | End: 2020-01-28 | Stop reason: SDUPTHER

## 2020-01-28 RX ORDER — MAGNESIUM SULFATE HEPTAHYDRATE 40 MG/ML
4 INJECTION, SOLUTION INTRAVENOUS AS NEEDED
Status: DISCONTINUED | OUTPATIENT
Start: 2020-01-28 | End: 2020-01-28 | Stop reason: SDUPTHER

## 2020-01-28 RX ORDER — PANTOPRAZOLE SODIUM 40 MG/1
40 TABLET, DELAYED RELEASE ORAL
Status: DISCONTINUED | OUTPATIENT
Start: 2020-01-28 | End: 2020-01-31 | Stop reason: HOSPADM

## 2020-01-28 RX ORDER — POTASSIUM CHLORIDE 750 MG/1
40 CAPSULE, EXTENDED RELEASE ORAL AS NEEDED
Status: DISCONTINUED | OUTPATIENT
Start: 2020-01-28 | End: 2020-01-31 | Stop reason: HOSPADM

## 2020-01-28 RX ORDER — MELATONIN
1000 DAILY
Status: DISCONTINUED | OUTPATIENT
Start: 2020-01-29 | End: 2020-01-31 | Stop reason: HOSPADM

## 2020-01-28 RX ORDER — ALLOPURINOL 100 MG/1
100 TABLET ORAL DAILY
Status: DISCONTINUED | OUTPATIENT
Start: 2020-01-29 | End: 2020-01-31 | Stop reason: HOSPADM

## 2020-01-28 RX ORDER — DOFETILIDE 0.5 MG/1
500 CAPSULE ORAL EVERY 12 HOURS SCHEDULED
Status: DISCONTINUED | OUTPATIENT
Start: 2020-01-28 | End: 2020-01-31 | Stop reason: HOSPADM

## 2020-01-28 RX ORDER — POTASSIUM CHLORIDE 7.45 MG/ML
10 INJECTION INTRAVENOUS
Status: DISCONTINUED | OUTPATIENT
Start: 2020-01-28 | End: 2020-01-31 | Stop reason: HOSPADM

## 2020-01-28 RX ORDER — ACETAMINOPHEN 325 MG/1
650 TABLET ORAL EVERY 6 HOURS PRN
Status: DISCONTINUED | OUTPATIENT
Start: 2020-01-28 | End: 2020-01-31 | Stop reason: HOSPADM

## 2020-01-28 RX ORDER — GABAPENTIN 400 MG/1
800 CAPSULE ORAL EVERY 8 HOURS SCHEDULED
Status: DISCONTINUED | OUTPATIENT
Start: 2020-01-28 | End: 2020-01-31 | Stop reason: HOSPADM

## 2020-01-28 RX ORDER — TIZANIDINE 4 MG/1
2 TABLET ORAL EVERY 8 HOURS SCHEDULED
Status: DISCONTINUED | OUTPATIENT
Start: 2020-01-28 | End: 2020-01-31 | Stop reason: HOSPADM

## 2020-01-28 RX ADMIN — OXYCODONE HYDROCHLORIDE AND ACETAMINOPHEN 1 TABLET: 10; 325 TABLET ORAL at 16:05

## 2020-01-28 RX ADMIN — GABAPENTIN 800 MG: 400 CAPSULE ORAL at 21:31

## 2020-01-28 RX ADMIN — MAGNESIUM SULFATE 4 G: 4 INJECTION INTRAVENOUS at 17:22

## 2020-01-28 RX ADMIN — PANTOPRAZOLE SODIUM 40 MG: 40 TABLET, DELAYED RELEASE ORAL at 17:23

## 2020-01-28 RX ADMIN — ATORVASTATIN CALCIUM 80 MG: 40 TABLET, FILM COATED ORAL at 21:31

## 2020-01-28 RX ADMIN — METOPROLOL TARTRATE 25 MG: 25 TABLET ORAL at 21:48

## 2020-01-28 RX ADMIN — APIXABAN 5 MG: 5 TABLET, FILM COATED ORAL at 21:31

## 2020-01-28 RX ADMIN — CLONAZEPAM 0.5 MG: 0.5 TABLET ORAL at 21:31

## 2020-01-28 RX ADMIN — TIZANIDINE 2 MG: 4 TABLET ORAL at 21:31

## 2020-01-28 RX ADMIN — GABAPENTIN 800 MG: 400 CAPSULE ORAL at 16:05

## 2020-01-28 RX ADMIN — TIZANIDINE 2 MG: 4 TABLET ORAL at 16:05

## 2020-01-28 RX ADMIN — DOFETILIDE 500 MCG: 0.5 CAPSULE ORAL at 17:22

## 2020-01-29 LAB
ANION GAP SERPL CALCULATED.3IONS-SCNC: 12 MMOL/L (ref 5–15)
BASOPHILS # BLD AUTO: 0.06 10*3/MM3 (ref 0–0.2)
BASOPHILS NFR BLD AUTO: 0.7 % (ref 0–1.5)
BUN BLD-MCNC: 13 MG/DL (ref 6–20)
BUN/CREAT SERPL: 15.5 (ref 7–25)
CALCIUM SPEC-SCNC: 9 MG/DL (ref 8.6–10.5)
CHLORIDE SERPL-SCNC: 101 MMOL/L (ref 98–107)
CO2 SERPL-SCNC: 24 MMOL/L (ref 22–29)
CREAT BLD-MCNC: 0.84 MG/DL (ref 0.57–1)
DEPRECATED RDW RBC AUTO: 45.3 FL (ref 37–54)
EOSINOPHIL # BLD AUTO: 0.1 10*3/MM3 (ref 0–0.4)
EOSINOPHIL NFR BLD AUTO: 1.1 % (ref 0.3–6.2)
ERYTHROCYTE [DISTWIDTH] IN BLOOD BY AUTOMATED COUNT: 14 % (ref 12.3–15.4)
GFR SERPL CREATININE-BSD FRML MDRD: 74 ML/MIN/1.73
GLUCOSE BLD-MCNC: 235 MG/DL (ref 65–99)
HCT VFR BLD AUTO: 39.1 % (ref 34–46.6)
HGB BLD-MCNC: 12.5 G/DL (ref 12–15.9)
IMM GRANULOCYTES # BLD AUTO: 0.02 10*3/MM3 (ref 0–0.05)
IMM GRANULOCYTES NFR BLD AUTO: 0.2 % (ref 0–0.5)
LYMPHOCYTES # BLD AUTO: 2.15 10*3/MM3 (ref 0.7–3.1)
LYMPHOCYTES NFR BLD AUTO: 24.7 % (ref 19.6–45.3)
MAGNESIUM SERPL-MCNC: 2.4 MG/DL (ref 1.6–2.6)
MCH RBC QN AUTO: 28.5 PG (ref 26.6–33)
MCHC RBC AUTO-ENTMCNC: 32 G/DL (ref 31.5–35.7)
MCV RBC AUTO: 89.1 FL (ref 79–97)
MONOCYTES # BLD AUTO: 0.69 10*3/MM3 (ref 0.1–0.9)
MONOCYTES NFR BLD AUTO: 7.9 % (ref 5–12)
NEUTROPHILS # BLD AUTO: 5.7 10*3/MM3 (ref 1.7–7)
NEUTROPHILS NFR BLD AUTO: 65.4 % (ref 42.7–76)
NRBC BLD AUTO-RTO: 0 /100 WBC (ref 0–0.2)
PLATELET # BLD AUTO: 226 10*3/MM3 (ref 140–450)
PMV BLD AUTO: 11.8 FL (ref 6–12)
POTASSIUM BLD-SCNC: 4.2 MMOL/L (ref 3.5–5.2)
RBC # BLD AUTO: 4.39 10*6/MM3 (ref 3.77–5.28)
SODIUM BLD-SCNC: 137 MMOL/L (ref 136–145)
WBC NRBC COR # BLD: 8.72 10*3/MM3 (ref 3.4–10.8)

## 2020-01-29 PROCEDURE — 83735 ASSAY OF MAGNESIUM: CPT | Performed by: PHYSICIAN ASSISTANT

## 2020-01-29 PROCEDURE — 80048 BASIC METABOLIC PNL TOTAL CA: CPT | Performed by: PHYSICIAN ASSISTANT

## 2020-01-29 PROCEDURE — 85025 COMPLETE CBC W/AUTO DIFF WBC: CPT | Performed by: PHYSICIAN ASSISTANT

## 2020-01-29 PROCEDURE — 93005 ELECTROCARDIOGRAM TRACING: CPT | Performed by: PHYSICIAN ASSISTANT

## 2020-01-29 RX ADMIN — CLONAZEPAM 0.5 MG: 0.5 TABLET ORAL at 09:07

## 2020-01-29 RX ADMIN — TIZANIDINE 2 MG: 4 TABLET ORAL at 14:29

## 2020-01-29 RX ADMIN — METOPROLOL TARTRATE 25 MG: 25 TABLET ORAL at 09:07

## 2020-01-29 RX ADMIN — APIXABAN 5 MG: 5 TABLET, FILM COATED ORAL at 21:29

## 2020-01-29 RX ADMIN — TIZANIDINE 2 MG: 4 TABLET ORAL at 05:29

## 2020-01-29 RX ADMIN — ALLOPURINOL 100 MG: 100 TABLET ORAL at 09:07

## 2020-01-29 RX ADMIN — DOFETILIDE 500 MCG: 0.5 CAPSULE ORAL at 05:29

## 2020-01-29 RX ADMIN — GABAPENTIN 800 MG: 400 CAPSULE ORAL at 05:28

## 2020-01-29 RX ADMIN — GABAPENTIN 800 MG: 400 CAPSULE ORAL at 21:29

## 2020-01-29 RX ADMIN — METOPROLOL TARTRATE 25 MG: 25 TABLET ORAL at 21:29

## 2020-01-29 RX ADMIN — TIZANIDINE 2 MG: 4 TABLET ORAL at 21:29

## 2020-01-29 RX ADMIN — DOFETILIDE 500 MCG: 0.5 CAPSULE ORAL at 18:16

## 2020-01-29 RX ADMIN — VITAMIN D, TAB 1000IU (100/BT) 1000 UNITS: 25 TAB at 09:07

## 2020-01-29 RX ADMIN — ATORVASTATIN CALCIUM 80 MG: 40 TABLET, FILM COATED ORAL at 21:29

## 2020-01-29 RX ADMIN — GABAPENTIN 800 MG: 400 CAPSULE ORAL at 14:28

## 2020-01-29 RX ADMIN — ORPHENADRINE CITRATE 100 MG: 100 TABLET, EXTENDED RELEASE ORAL at 10:55

## 2020-01-29 RX ADMIN — APIXABAN 5 MG: 5 TABLET, FILM COATED ORAL at 09:07

## 2020-01-29 RX ADMIN — CLONAZEPAM 0.5 MG: 0.5 TABLET ORAL at 21:30

## 2020-01-29 RX ADMIN — OXYCODONE HYDROCHLORIDE AND ACETAMINOPHEN 1 TABLET: 10; 325 TABLET ORAL at 00:31

## 2020-01-29 RX ADMIN — OXYCODONE HYDROCHLORIDE AND ACETAMINOPHEN 1 TABLET: 10; 325 TABLET ORAL at 16:31

## 2020-01-29 RX ADMIN — OXYCODONE HYDROCHLORIDE AND ACETAMINOPHEN 1 TABLET: 10; 325 TABLET ORAL at 09:07

## 2020-01-29 RX ADMIN — LINAGLIPTIN 5 MG: 5 TABLET, FILM COATED ORAL at 09:07

## 2020-01-29 RX ADMIN — PANTOPRAZOLE SODIUM 40 MG: 40 TABLET, DELAYED RELEASE ORAL at 05:29

## 2020-01-30 LAB
ANION GAP SERPL CALCULATED.3IONS-SCNC: 18 MMOL/L (ref 5–15)
BUN BLD-MCNC: 15 MG/DL (ref 6–20)
BUN/CREAT SERPL: 15 (ref 7–25)
CALCIUM SPEC-SCNC: 8.9 MG/DL (ref 8.6–10.5)
CHLORIDE SERPL-SCNC: 96 MMOL/L (ref 98–107)
CO2 SERPL-SCNC: 19 MMOL/L (ref 22–29)
CREAT BLD-MCNC: 1 MG/DL (ref 0.57–1)
GFR SERPL CREATININE-BSD FRML MDRD: 60 ML/MIN/1.73
GLUCOSE BLD-MCNC: 299 MG/DL (ref 65–99)
GLUCOSE BLDC GLUCOMTR-MCNC: 255 MG/DL (ref 70–130)
MAGNESIUM SERPL-MCNC: 1.9 MG/DL (ref 1.6–2.6)
POTASSIUM BLD-SCNC: 5.2 MMOL/L (ref 3.5–5.2)
SODIUM BLD-SCNC: 133 MMOL/L (ref 136–145)

## 2020-01-30 PROCEDURE — 80048 BASIC METABOLIC PNL TOTAL CA: CPT

## 2020-01-30 PROCEDURE — 82962 GLUCOSE BLOOD TEST: CPT

## 2020-01-30 PROCEDURE — 93005 ELECTROCARDIOGRAM TRACING: CPT | Performed by: INTERNAL MEDICINE

## 2020-01-30 PROCEDURE — 25010000002 MORPHINE PER 10 MG: Performed by: INTERNAL MEDICINE

## 2020-01-30 PROCEDURE — 83735 ASSAY OF MAGNESIUM: CPT

## 2020-01-30 PROCEDURE — 93005 ELECTROCARDIOGRAM TRACING: CPT | Performed by: PHYSICIAN ASSISTANT

## 2020-01-30 RX ORDER — MORPHINE SULFATE 2 MG/ML
2 INJECTION, SOLUTION INTRAMUSCULAR; INTRAVENOUS ONCE
Status: COMPLETED | OUTPATIENT
Start: 2020-01-30 | End: 2020-01-30

## 2020-01-30 RX ADMIN — GABAPENTIN 800 MG: 400 CAPSULE ORAL at 05:12

## 2020-01-30 RX ADMIN — OXYCODONE HYDROCHLORIDE AND ACETAMINOPHEN 1 TABLET: 10; 325 TABLET ORAL at 09:21

## 2020-01-30 RX ADMIN — APIXABAN 5 MG: 5 TABLET, FILM COATED ORAL at 21:26

## 2020-01-30 RX ADMIN — TIZANIDINE 2 MG: 4 TABLET ORAL at 05:13

## 2020-01-30 RX ADMIN — GABAPENTIN 800 MG: 400 CAPSULE ORAL at 15:08

## 2020-01-30 RX ADMIN — DOFETILIDE 500 MCG: 0.5 CAPSULE ORAL at 17:54

## 2020-01-30 RX ADMIN — MORPHINE SULFATE 2 MG: 2 INJECTION, SOLUTION INTRAMUSCULAR; INTRAVENOUS at 05:13

## 2020-01-30 RX ADMIN — TIZANIDINE 2 MG: 4 TABLET ORAL at 15:08

## 2020-01-30 RX ADMIN — GABAPENTIN 800 MG: 400 CAPSULE ORAL at 21:26

## 2020-01-30 RX ADMIN — CLONAZEPAM 0.5 MG: 0.5 TABLET ORAL at 21:26

## 2020-01-30 RX ADMIN — PANTOPRAZOLE SODIUM 40 MG: 40 TABLET, DELAYED RELEASE ORAL at 05:12

## 2020-01-30 RX ADMIN — ALLOPURINOL 100 MG: 100 TABLET ORAL at 09:22

## 2020-01-30 RX ADMIN — OXYCODONE HYDROCHLORIDE AND ACETAMINOPHEN 1 TABLET: 10; 325 TABLET ORAL at 00:27

## 2020-01-30 RX ADMIN — METOPROLOL TARTRATE 25 MG: 25 TABLET ORAL at 09:22

## 2020-01-30 RX ADMIN — ATORVASTATIN CALCIUM 80 MG: 40 TABLET, FILM COATED ORAL at 21:26

## 2020-01-30 RX ADMIN — DOFETILIDE 500 MCG: 0.5 CAPSULE ORAL at 05:13

## 2020-01-30 RX ADMIN — LINAGLIPTIN 5 MG: 5 TABLET, FILM COATED ORAL at 09:21

## 2020-01-30 RX ADMIN — OXYCODONE HYDROCHLORIDE AND ACETAMINOPHEN 1 TABLET: 10; 325 TABLET ORAL at 17:54

## 2020-01-30 RX ADMIN — CLONAZEPAM 0.5 MG: 0.5 TABLET ORAL at 09:22

## 2020-01-30 RX ADMIN — VITAMIN D, TAB 1000IU (100/BT) 1000 UNITS: 25 TAB at 09:21

## 2020-01-30 RX ADMIN — MAGNESIUM SULFATE 4 G: 4 INJECTION INTRAVENOUS at 15:08

## 2020-01-30 RX ADMIN — TIZANIDINE 2 MG: 4 TABLET ORAL at 21:26

## 2020-01-30 RX ADMIN — APIXABAN 5 MG: 5 TABLET, FILM COATED ORAL at 09:22

## 2020-01-31 VITALS
RESPIRATION RATE: 18 BRPM | SYSTOLIC BLOOD PRESSURE: 130 MMHG | OXYGEN SATURATION: 100 % | WEIGHT: 274 LBS | BODY MASS INDEX: 46.78 KG/M2 | HEART RATE: 65 BPM | TEMPERATURE: 97.9 F | DIASTOLIC BLOOD PRESSURE: 74 MMHG | HEIGHT: 64 IN

## 2020-01-31 LAB
ANION GAP SERPL CALCULATED.3IONS-SCNC: 14 MMOL/L (ref 5–15)
BUN BLD-MCNC: 14 MG/DL (ref 6–20)
BUN/CREAT SERPL: 17.7 (ref 7–25)
CALCIUM SPEC-SCNC: 8.9 MG/DL (ref 8.6–10.5)
CHLORIDE SERPL-SCNC: 97 MMOL/L (ref 98–107)
CO2 SERPL-SCNC: 23 MMOL/L (ref 22–29)
CREAT BLD-MCNC: 0.79 MG/DL (ref 0.57–1)
GFR SERPL CREATININE-BSD FRML MDRD: 79 ML/MIN/1.73
GLUCOSE BLD-MCNC: 221 MG/DL (ref 65–99)
MAGNESIUM SERPL-MCNC: 2.4 MG/DL (ref 1.6–2.6)
POTASSIUM BLD-SCNC: 4.2 MMOL/L (ref 3.5–5.2)
SODIUM BLD-SCNC: 134 MMOL/L (ref 136–145)

## 2020-01-31 PROCEDURE — 83735 ASSAY OF MAGNESIUM: CPT

## 2020-01-31 PROCEDURE — 80048 BASIC METABOLIC PNL TOTAL CA: CPT

## 2020-01-31 PROCEDURE — 93005 ELECTROCARDIOGRAM TRACING: CPT | Performed by: PHYSICIAN ASSISTANT

## 2020-01-31 RX ORDER — DOFETILIDE 0.5 MG/1
500 CAPSULE ORAL EVERY 12 HOURS SCHEDULED
Qty: 60 CAPSULE | Refills: 11 | Status: SHIPPED | OUTPATIENT
Start: 2020-01-31

## 2020-01-31 RX ADMIN — ALLOPURINOL 100 MG: 100 TABLET ORAL at 09:06

## 2020-01-31 RX ADMIN — TIZANIDINE 2 MG: 4 TABLET ORAL at 06:04

## 2020-01-31 RX ADMIN — DOFETILIDE 500 MCG: 0.5 CAPSULE ORAL at 06:03

## 2020-01-31 RX ADMIN — OXYCODONE HYDROCHLORIDE AND ACETAMINOPHEN 1 TABLET: 10; 325 TABLET ORAL at 09:06

## 2020-01-31 RX ADMIN — OXYCODONE HYDROCHLORIDE AND ACETAMINOPHEN 1 TABLET: 10; 325 TABLET ORAL at 00:43

## 2020-01-31 RX ADMIN — METOPROLOL TARTRATE 25 MG: 25 TABLET ORAL at 09:05

## 2020-01-31 RX ADMIN — APIXABAN 5 MG: 5 TABLET, FILM COATED ORAL at 09:06

## 2020-01-31 RX ADMIN — GABAPENTIN 800 MG: 400 CAPSULE ORAL at 06:03

## 2020-01-31 RX ADMIN — CLONAZEPAM 0.5 MG: 0.5 TABLET ORAL at 09:06

## 2020-01-31 RX ADMIN — LINAGLIPTIN 5 MG: 5 TABLET, FILM COATED ORAL at 09:05

## 2020-01-31 RX ADMIN — VITAMIN D, TAB 1000IU (100/BT) 1000 UNITS: 25 TAB at 09:06

## 2020-01-31 RX ADMIN — PANTOPRAZOLE SODIUM 40 MG: 40 TABLET, DELAYED RELEASE ORAL at 06:03

## 2020-02-01 ENCOUNTER — READMISSION MANAGEMENT (OUTPATIENT)
Dept: CALL CENTER | Facility: HOSPITAL | Age: 45
End: 2020-02-01

## 2020-02-01 NOTE — OUTREACH NOTE
Prep Survey      Responses   Facility patient discharged from?  [facility]   Is patient eligible?  Yes   Discharge diagnosis  AFIB   Does the patient have one of the following disease processes/diagnoses(primary or secondary)?  Other   Does the patient have Home health ordered?  No   Is there a DME ordered?  No   Comments regarding appointments  office to call - see AVS   Medication alerts for this patient  [email or web address]   Prep survey completed?  Yes          Anca Peguero RN

## 2020-02-03 ENCOUNTER — READMISSION MANAGEMENT (OUTPATIENT)
Dept: CALL CENTER | Facility: HOSPITAL | Age: 45
End: 2020-02-03

## 2020-02-03 NOTE — OUTREACH NOTE
Medical Week 1 Survey      Responses   Facility patient discharged from?  [facility]   Does the patient have one of the following disease processes/diagnoses(primary or secondary)?  Other   Is there a successful TCM telephone encounter documented?  No   Week 1 attempt successful?  Yes   Call start time  1728   Discharge diagnosis  AFIB   Medication alerts for this patient  [email or web address]   Meds reviewed with patient/caregiver?  Yes   Is the patient having any side effects they believe may be caused by any medication additions or changes?  No   Does the patient have all medications ordered at discharge?  Yes   Is the patient taking all medications as directed (includes completed medication regime)?  Yes   Does the patient have a primary care provider?   Yes   Comments regarding PCP  Dr Butt PCP this week   Has the patient kept scheduled appointments due by today?  Yes   Has home health visited the patient within 72 hours of discharge?  N/A   Psychosocial issues?  No   Did the patient receive a copy of their discharge instructions?  Yes   Nursing interventions  Reviewed instructions with patient   What is the patient's perception of their health status since discharge?  Improving   Is the patient/caregiver able to teach back signs and symptoms related to disease process for when to call PCP?  Yes   Is the patient/caregiver able to teach back signs and symptoms related to disease process for when to call 911?  Yes   Is the patient/caregiver able to teach back the hierarchy of who to call/visit for symptoms/problems? PCP, Specialist, Home health nurse, Urgent Care, ED, 911  Yes   Week 1 call completed?  Yes          Alec Craft RN

## 2020-02-04 ENCOUNTER — EPISODE CHANGES (OUTPATIENT)
Dept: CASE MANAGEMENT | Facility: OTHER | Age: 45
End: 2020-02-04

## 2020-02-11 ENCOUNTER — READMISSION MANAGEMENT (OUTPATIENT)
Dept: CALL CENTER | Facility: HOSPITAL | Age: 45
End: 2020-02-11

## 2020-02-11 NOTE — OUTREACH NOTE
Medical Week 2 Survey      Responses   Facility patient discharged from?  [facility]   Does the patient have one of the following disease processes/diagnoses(primary or secondary)?  Other   Week 2 attempt successful?  No   Unsuccessful attempts  Attempt 1          Wil Dietrich RN

## 2020-02-12 ENCOUNTER — READMISSION MANAGEMENT (OUTPATIENT)
Dept: CALL CENTER | Facility: HOSPITAL | Age: 45
End: 2020-02-12

## 2020-02-12 NOTE — OUTREACH NOTE
Medical Week 2 Survey      Responses   Facility patient discharged from?  [facility]   Does the patient have one of the following disease processes/diagnoses(primary or secondary)?  Other   Week 2 attempt successful?  No   Unsuccessful attempts  Attempt 2          Tamica Gordon RN

## 2020-02-14 ENCOUNTER — READMISSION MANAGEMENT (OUTPATIENT)
Dept: CALL CENTER | Facility: HOSPITAL | Age: 45
End: 2020-02-14

## 2020-02-14 NOTE — OUTREACH NOTE
Medical Week 3 Survey      Responses   Facility patient discharged from?  [facility]   Does the patient have one of the following disease processes/diagnoses(primary or secondary)?  Other   Week 3 attempt successful?  No   Unsuccessful attempts  Attempt 1          Kelli Dixon RN

## 2020-02-18 ENCOUNTER — READMISSION MANAGEMENT (OUTPATIENT)
Dept: CALL CENTER | Facility: HOSPITAL | Age: 45
End: 2020-02-18

## 2020-02-18 NOTE — OUTREACH NOTE
Medical Week 3 Survey      Responses   Facility patient discharged from?  [place]   Does the patient have one of the following disease processes/diagnoses(primary or secondary)?  Other   Week 3 attempt successful?  No   Unsuccessful attempts  Attempt 2          Linda Navarro RN

## 2020-03-16 ENCOUNTER — EPISODE CHANGES (OUTPATIENT)
Dept: CASE MANAGEMENT | Facility: OTHER | Age: 45
End: 2020-03-16

## 2020-03-23 ENCOUNTER — EPISODE CHANGES (OUTPATIENT)
Dept: CASE MANAGEMENT | Facility: OTHER | Age: 45
End: 2020-03-23

## 2020-03-30 ENCOUNTER — HOSPITAL ENCOUNTER (EMERGENCY)
Facility: HOSPITAL | Age: 45
Discharge: HOME | End: 2020-03-30
Attending: FAMILY MEDICINE

## 2020-03-30 ENCOUNTER — APPOINTMENT (OUTPATIENT)
Dept: GENERAL RADIOLOGY | Facility: HOSPITAL | Age: 45
End: 2020-03-30

## 2020-03-30 VITALS
HEIGHT: 64 IN | WEIGHT: 274 LBS | DIASTOLIC BLOOD PRESSURE: 88 MMHG | SYSTOLIC BLOOD PRESSURE: 130 MMHG | TEMPERATURE: 98.8 F | OXYGEN SATURATION: 100 % | BODY MASS INDEX: 46.78 KG/M2 | RESPIRATION RATE: 16 BRPM | HEART RATE: 78 BPM

## 2020-03-30 DIAGNOSIS — M25.562 ACUTE PAIN OF LEFT KNEE: Primary | ICD-10-CM

## 2020-03-30 PROCEDURE — 96372 THER/PROPH/DIAG INJ SC/IM: CPT

## 2020-03-30 PROCEDURE — 73560 X-RAY EXAM OF KNEE 1 OR 2: CPT

## 2020-03-30 PROCEDURE — 99283 EMERGENCY DEPT VISIT LOW MDM: CPT

## 2020-03-30 PROCEDURE — 25010000002 KETOROLAC TROMETHAMINE PER 15 MG: Performed by: FAMILY MEDICINE

## 2020-03-30 PROCEDURE — 73560 X-RAY EXAM OF KNEE 1 OR 2: CPT | Performed by: RADIOLOGY

## 2020-03-30 RX ORDER — KETOROLAC TROMETHAMINE 30 MG/ML
60 INJECTION, SOLUTION INTRAMUSCULAR; INTRAVENOUS ONCE
Status: COMPLETED | OUTPATIENT
Start: 2020-03-30 | End: 2020-03-30

## 2020-03-30 RX ORDER — HYDROCODONE BITARTRATE AND ACETAMINOPHEN 10; 325 MG/1; MG/1
1 TABLET ORAL ONCE
Status: COMPLETED | OUTPATIENT
Start: 2020-03-30 | End: 2020-03-30

## 2020-03-30 RX ORDER — HYDROCODONE BITARTRATE AND ACETAMINOPHEN 5; 325 MG/1; MG/1
1 TABLET ORAL EVERY 6 HOURS PRN
Qty: 6 TABLET | Refills: 0 | Status: SHIPPED | OUTPATIENT
Start: 2020-03-30 | End: 2022-10-13

## 2020-03-30 RX ADMIN — KETOROLAC TROMETHAMINE 60 MG: 60 INJECTION, SOLUTION INTRAMUSCULAR at 20:13

## 2020-03-30 RX ADMIN — HYDROCODONE BITARTRATE AND ACETAMINOPHEN 1 TABLET: 10; 325 TABLET ORAL at 20:13

## 2020-03-31 ENCOUNTER — EPISODE CHANGES (OUTPATIENT)
Dept: CASE MANAGEMENT | Facility: OTHER | Age: 45
End: 2020-03-31

## 2020-10-09 ENCOUNTER — APPOINTMENT (OUTPATIENT)
Dept: MAMMOGRAPHY | Facility: HOSPITAL | Age: 45
End: 2020-10-09

## 2022-07-22 ENCOUNTER — TRANSCRIBE ORDERS (OUTPATIENT)
Dept: ADMINISTRATIVE | Facility: HOSPITAL | Age: 47
End: 2022-07-22

## 2022-07-22 DIAGNOSIS — R13.19 ESOPHAGEAL DYSPHAGIA: Primary | ICD-10-CM

## 2022-08-03 ENCOUNTER — APPOINTMENT (OUTPATIENT)
Dept: GENERAL RADIOLOGY | Facility: HOSPITAL | Age: 47
End: 2022-08-03

## 2022-08-09 ENCOUNTER — HOSPITAL ENCOUNTER (OUTPATIENT)
Dept: GENERAL RADIOLOGY | Facility: HOSPITAL | Age: 47
Discharge: HOME | End: 2022-08-09
Admitting: FAMILY MEDICINE

## 2022-08-09 DIAGNOSIS — R13.19 ESOPHAGEAL DYSPHAGIA: ICD-10-CM

## 2022-08-09 PROCEDURE — 74220 X-RAY XM ESOPHAGUS 1CNTRST: CPT | Performed by: RADIOLOGY

## 2022-08-09 PROCEDURE — 74220 X-RAY XM ESOPHAGUS 1CNTRST: CPT

## 2022-10-13 ENCOUNTER — OFFICE VISIT (OUTPATIENT)
Dept: GASTROENTEROLOGY | Facility: CLINIC | Age: 47
End: 2022-10-13

## 2022-10-13 VITALS
DIASTOLIC BLOOD PRESSURE: 70 MMHG | SYSTOLIC BLOOD PRESSURE: 129 MMHG | HEART RATE: 75 BPM | BODY MASS INDEX: 47.29 KG/M2 | HEIGHT: 64 IN | WEIGHT: 277 LBS | OXYGEN SATURATION: 96 %

## 2022-10-13 DIAGNOSIS — K21.9 GASTROESOPHAGEAL REFLUX DISEASE, UNSPECIFIED WHETHER ESOPHAGITIS PRESENT: ICD-10-CM

## 2022-10-13 DIAGNOSIS — R13.19 ESOPHAGEAL DYSPHAGIA: Primary | ICD-10-CM

## 2022-10-13 PROCEDURE — 99204 OFFICE O/P NEW MOD 45 MIN: CPT | Performed by: PHYSICIAN ASSISTANT

## 2022-10-13 RX ORDER — LORATADINE 10 MG/1
CAPSULE, LIQUID FILLED ORAL
COMMUNITY

## 2022-10-13 RX ORDER — MULTIPLE VITAMINS W/ MINERALS TAB 9MG-400MCG
1 TAB ORAL DAILY
COMMUNITY

## 2022-10-13 RX ORDER — FAMOTIDINE 40 MG/1
40 TABLET, FILM COATED ORAL 2 TIMES DAILY
COMMUNITY
End: 2022-11-15 | Stop reason: HOSPADM

## 2022-10-13 NOTE — PROGRESS NOTES
Chief Complaint   Patient presents with   • Difficulty Swallowing       Madelyn Velazquez is a 46 y.o. female who presents to the office today for evaluation of Difficulty Swallowing  .    HPI    The patient was seen for a GI evaluation of difficulty swallowing.  Patient reports that she has had problems swallowing for years but it has significantly worsened over the past two months.   She states that she has problems with both liquids and solids.  Three years ago she had a CVA.  She had an esophagram on 8/9/22 which revealed a small hiatal hernia and gastroesophageal reflux.  During the study the pill became lodged and caused discomfort.  With a lot of water the pill finally went on down her esophagus.  She had an EGD 10 years and does not think there was any abnormal finding other than results of acid reflux.  She currently 40 mg BID and Pepcid 40 mg BID.  Patient states that they offer her some control but she still has some breakthrough reflux.  She stopped her sodas almost two years ago.  She only has occasional drinks a few times per week.  She denies alcohol use.  Patient had a colonoscopy several years ago and had no colon polyps.  She reports episodes of nausea and vomiting.  Patient had to have her esophagus stretched years ago.  She states that when food gets stuck and she coughs a lot she sees a small amount of dark reddish-black material.  She also has a history of stomach ulcers.  Patient denies black stools, rectal bleeding, diarrhea, and constipation.  She reports her stool consistency as type 2 on the [place] Stool Form Scale.  Family history is negative for GI disease.       Review of Systems   Constitutional: Negative for fever.   HENT: Positive for trouble swallowing.    Eyes: Negative.    Respiratory: Negative.    Cardiovascular: Negative.    Gastrointestinal: Positive for abdominal pain. Negative for abdominal distention, anal bleeding, blood in stool, constipation, diarrhea, nausea and  vomiting.   Endocrine: Negative.    Genitourinary: Negative.    Musculoskeletal: Negative.    Skin: Negative.    Allergic/Immunologic: Negative.    Neurological: Negative.    Hematological: Negative.    Psychiatric/Behavioral: Negative.        ACTIVE PROBLEMS:   Specialty Problems    None      PAST MEDICAL HISTORY:  Past Medical History:   Diagnosis Date   • A-fib (HCC)    • Arrhythmia     ATRIAL FIB   • Back pain    • CTS (carpal tunnel syndrome)    • Diabetes mellitus (HCC)    • GERD (gastroesophageal reflux disease)    • Gout    • Migraine    • Osteoarthritis    • Stroke (HCC)     DEC 30 2018       SURGICAL HISTORY:  Past Surgical History:   Procedure Laterality Date   • CARPAL TUNNEL RELEASE Left    • CHOLECYSTECTOMY     • HAND SURGERY Left    • LEG SURGERY Left    • OVARIAN CYST SURGERY         FAMILY HISTORY:  Family History   Problem Relation Age of Onset   • Osteoarthritis Mother    • Heart disease Mother    • Hypertension Mother    • Osteoarthritis Father    • Hypertension Father    • Osteoarthritis Sister    • Rheum arthritis Sister    • Heart disease Sister    • Hypertension Sister    • Migraines Sister    • Osteoarthritis Brother    • Heart disease Brother    • Hypertension Brother    • Osteoarthritis Maternal Grandmother    • Heart disease Maternal Grandmother    • Hypertension Maternal Grandmother    • Osteoarthritis Maternal Grandfather    • Heart disease Maternal Grandfather    • Hypertension Maternal Grandfather    • Breast cancer Neg Hx        SOCIAL HISTORY:  Social History     Tobacco Use   • Smoking status: Never   • Smokeless tobacco: Never   Substance Use Topics   • Alcohol use: No       CURRENT MEDICATION:    Current Outpatient Medications:   •  allopurinol (ZYLOPRIM) 100 MG tablet, Take 1 tablet by mouth Daily., Disp: , Rfl:   •  apixaban (ELIQUIS) 5 MG tablet tablet, Take 1 tablet by mouth Every 12 (Twelve) Hours., Disp: 60 tablet, Rfl: 1  •  aspirin 81 MG chewable tablet, Chew 1 tablet  "Daily., Disp: 90 tablet, Rfl: 2  •  atorvastatin (LIPITOR) 80 MG tablet, Take 1 tablet by mouth Every Night., Disp: 90 tablet, Rfl: 2  •  cholecalciferol (VITAMIN D3) 400 units tablet, Take 400 Units by mouth Daily., Disp: , Rfl:   •  diclofenac (VOLTAREN) 1 % gel gel, Apply 4 g topically to the appropriate area as directed 3 (Three) Times a Day., Disp: 1 tube, Rfl: 0  •  dofetilide (TIKOSYN) 500 MCG capsule, Take 1 capsule by mouth Every 12 (Twelve) Hours., Disp: 60 capsule, Rfl: 11  •  famotidine (PEPCID) 40 MG tablet, Take 1 tablet by mouth 2 (Two) Times a Day., Disp: , Rfl:   •  gabapentin (NEURONTIN) 800 MG tablet, Take 800 mg by mouth 3 (Three) Times a Day., Disp: , Rfl:   •  Loratadine 10 MG capsule, Take  by mouth., Disp: , Rfl:   •  metoprolol tartrate (LOPRESSOR) 25 MG tablet, Take 25 mg by mouth 2 (Two) Times a Day., Disp: , Rfl:   •  multivitamin with minerals (OCUVITE PO), Take 1 tablet by mouth Daily., Disp: , Rfl:   •  omeprazole (priLOSEC) 20 MG capsule, Take 40 mg by mouth., Disp: , Rfl:   •  oxyCODONE-acetaminophen (PERCOCET)  MG per tablet, Take 1 tablet by mouth Every 8 (Eight) Hours., Disp: , Rfl:   •  SITagliptin (JANUVIA) 100 MG tablet, Take 100 mg by mouth Daily., Disp: , Rfl:   •  tiZANidine (ZANAFLEX) 2 MG tablet, Take 1 tablet by mouth Every 8 (Eight) Hours., Disp: , Rfl:     ALLERGIES:  Colchicine, Ultram [tramadol hcl], Latex, Tape, and Metformin    VISIT VITALS:  Blood Pressure 129/70   Pulse 75   Height 162.6 cm (64\")   Weight 126 kg (277 lb)   Oxygen Saturation 96%   Body Mass Index 47.55 kg/m²   Physical Exam  Constitutional:       General: She is not in acute distress.     Appearance: She is well-developed. She is obese. She is not diaphoretic.   HENT:      Head: Normocephalic and atraumatic.      Right Ear: External ear normal.      Left Ear: External ear normal.      Nose: Nose normal.      Mouth/Throat:      Pharynx: No oropharyngeal exudate.   Eyes:      General: No " scleral icterus.        Right eye: No discharge.         Left eye: No discharge.      Conjunctiva/sclera: Conjunctivae normal.      Pupils: Pupils are equal, round, and reactive to light.   Neck:      Thyroid: No thyromegaly.      Vascular: No JVD.      Trachea: No tracheal deviation.   Cardiovascular:      Rate and Rhythm: Normal rate and regular rhythm.      Heart sounds: Normal heart sounds. No murmur heard.    No friction rub. No gallop.   Pulmonary:      Effort: Pulmonary effort is normal. No respiratory distress.      Breath sounds: Normal breath sounds. No stridor. No wheezing or rales.   Chest:      Chest wall: No tenderness.   Abdominal:      General: Bowel sounds are normal. There is no distension.      Palpations: Abdomen is soft. There is no mass.      Tenderness: There is no abdominal tenderness. There is no guarding or rebound.      Hernia: No hernia is present.   Genitourinary:     Rectum: Guaiac result negative.   Musculoskeletal:      Cervical back: Normal range of motion and neck supple.      Right lower leg: Edema present.      Left lower leg: Edema present.   Lymphadenopathy:      Cervical: No cervical adenopathy.   Skin:     General: Skin is warm and dry.      Coloration: Skin is not pale.      Findings: No erythema or rash.   Neurological:      Mental Status: She is alert and oriented to person, place, and time.      Cranial Nerves: No cranial nerve deficit.      Motor: No abnormal muscle tone.      Coordination: Coordination normal.      Deep Tendon Reflexes: Reflexes are normal and symmetric. Reflexes normal.   Psychiatric:         Behavior: Behavior normal.         Thought Content: Thought content normal.         Judgment: Judgment normal.         Assessment & Plan      Diagnosis Plan   1. Esophageal dysphagia  Case Request    Follow Anesthesia Guidelines / Protocol    Obtain Informed Consent    Case Request      2. Gastroesophageal reflux disease, unspecified whether esophagitis present   Case Request    Follow Anesthesia Guidelines / Protocol    Obtain Informed Consent    Case Request        The patient will be scheduled for an EGD/colonoscopy.   She voiced understanding and agreement of procedures.  She was encouraged to increase water and fiber intake and decrease caffeine to have a more normal consistency stool.    Return in about 8 weeks (around 12/8/2022) for Recheck.         Class 3 Severe Obesity (BMI >=40). Obesity-related health conditions include the following: GERD. Obesity is unchanged. BMI is is above average; BMI management plan is completed. We discussed portion control and increasing exercise.      DENNY Antonio

## 2022-11-03 ENCOUNTER — TELEPHONE (OUTPATIENT)
Dept: GASTROENTEROLOGY | Facility: CLINIC | Age: 47
End: 2022-11-03

## 2022-11-08 PROBLEM — K21.9 GASTROESOPHAGEAL REFLUX DISEASE: Status: ACTIVE | Noted: 2022-11-08

## 2022-11-08 PROBLEM — R13.19 ESOPHAGEAL DYSPHAGIA: Status: ACTIVE | Noted: 2022-11-08

## 2022-11-10 DIAGNOSIS — K21.9 GASTROESOPHAGEAL REFLUX DISEASE, UNSPECIFIED WHETHER ESOPHAGITIS PRESENT: ICD-10-CM

## 2022-11-10 DIAGNOSIS — R13.19 ESOPHAGEAL DYSPHAGIA: Primary | ICD-10-CM

## 2022-11-10 RX ORDER — BISACODYL 5 MG/1
20 TABLET, DELAYED RELEASE ORAL ONCE
Qty: 4 TABLET | Refills: 0 | Status: SHIPPED | OUTPATIENT
Start: 2022-11-10 | End: 2022-11-10

## 2022-11-10 RX ORDER — POLYETHYLENE GLYCOL 3350 17 G/17G
510 POWDER, FOR SOLUTION ORAL ONCE
Qty: 510 G | Refills: 0 | Status: SHIPPED | OUTPATIENT
Start: 2022-11-10 | End: 2022-11-10

## 2022-11-14 RX ORDER — SEMAGLUTIDE 1.34 MG/ML
INJECTION, SOLUTION SUBCUTANEOUS WEEKLY
COMMUNITY

## 2022-11-15 ENCOUNTER — ANESTHESIA (OUTPATIENT)
Dept: PERIOP | Facility: HOSPITAL | Age: 47
End: 2022-11-15

## 2022-11-15 ENCOUNTER — ANESTHESIA EVENT (OUTPATIENT)
Dept: PERIOP | Facility: HOSPITAL | Age: 47
End: 2022-11-15

## 2022-11-15 ENCOUNTER — HOSPITAL ENCOUNTER (OUTPATIENT)
Facility: HOSPITAL | Age: 47
Setting detail: HOSPITAL OUTPATIENT SURGERY
Discharge: HOME | End: 2022-11-15
Attending: INTERNAL MEDICINE | Admitting: INTERNAL MEDICINE

## 2022-11-15 VITALS
DIASTOLIC BLOOD PRESSURE: 78 MMHG | OXYGEN SATURATION: 97 % | BODY MASS INDEX: 47.29 KG/M2 | HEART RATE: 81 BPM | WEIGHT: 277 LBS | RESPIRATION RATE: 18 BRPM | SYSTOLIC BLOOD PRESSURE: 134 MMHG | HEIGHT: 64 IN | TEMPERATURE: 97.8 F

## 2022-11-15 DIAGNOSIS — K21.9 GASTROESOPHAGEAL REFLUX DISEASE, UNSPECIFIED WHETHER ESOPHAGITIS PRESENT: ICD-10-CM

## 2022-11-15 DIAGNOSIS — R13.19 ESOPHAGEAL DYSPHAGIA: ICD-10-CM

## 2022-11-15 LAB
B-HCG UR QL: NEGATIVE
EXPIRATION DATE: NORMAL
GLUCOSE BLDC GLUCOMTR-MCNC: 151 MG/DL (ref 70–130)
INTERNAL NEGATIVE CONTROL: NEGATIVE
INTERNAL POSITIVE CONTROL: POSITIVE
Lab: NORMAL

## 2022-11-15 PROCEDURE — 82962 GLUCOSE BLOOD TEST: CPT

## 2022-11-15 PROCEDURE — 25010000002 FENTANYL CITRATE (PF) 50 MCG/ML SOLUTION: Performed by: NURSE ANESTHETIST, CERTIFIED REGISTERED

## 2022-11-15 PROCEDURE — G0121 COLON CA SCRN NOT HI RSK IND: HCPCS | Performed by: INTERNAL MEDICINE

## 2022-11-15 PROCEDURE — 88312 SPECIAL STAINS GROUP 1: CPT

## 2022-11-15 PROCEDURE — 25010000002 PROPOFOL 10 MG/ML EMULSION: Performed by: NURSE ANESTHETIST, CERTIFIED REGISTERED

## 2022-11-15 PROCEDURE — 88305 TISSUE EXAM BY PATHOLOGIST: CPT

## 2022-11-15 PROCEDURE — 43239 EGD BIOPSY SINGLE/MULTIPLE: CPT | Performed by: INTERNAL MEDICINE

## 2022-11-15 PROCEDURE — 81025 URINE PREGNANCY TEST: CPT | Performed by: ANESTHESIOLOGY

## 2022-11-15 RX ORDER — DEXLANSOPRAZOLE 60 MG/1
60 CAPSULE, DELAYED RELEASE ORAL DAILY
Qty: 30 CAPSULE | Refills: 5 | Status: SHIPPED | OUTPATIENT
Start: 2022-11-15

## 2022-11-15 RX ORDER — IPRATROPIUM BROMIDE AND ALBUTEROL SULFATE 2.5; .5 MG/3ML; MG/3ML
3 SOLUTION RESPIRATORY (INHALATION) ONCE AS NEEDED
Status: DISCONTINUED | OUTPATIENT
Start: 2022-11-15 | End: 2022-11-15 | Stop reason: HOSPADM

## 2022-11-15 RX ORDER — FENTANYL CITRATE 50 UG/ML
50 INJECTION, SOLUTION INTRAMUSCULAR; INTRAVENOUS
Status: DISCONTINUED | OUTPATIENT
Start: 2022-11-15 | End: 2022-11-15 | Stop reason: HOSPADM

## 2022-11-15 RX ORDER — ONDANSETRON 2 MG/ML
4 INJECTION INTRAMUSCULAR; INTRAVENOUS AS NEEDED
Status: DISCONTINUED | OUTPATIENT
Start: 2022-11-15 | End: 2022-11-15 | Stop reason: HOSPADM

## 2022-11-15 RX ORDER — LIDOCAINE HYDROCHLORIDE 20 MG/ML
INJECTION, SOLUTION EPIDURAL; INFILTRATION; INTRACAUDAL; PERINEURAL AS NEEDED
Status: DISCONTINUED | OUTPATIENT
Start: 2022-11-15 | End: 2022-11-15 | Stop reason: SURG

## 2022-11-15 RX ORDER — SODIUM CHLORIDE, SODIUM LACTATE, POTASSIUM CHLORIDE, CALCIUM CHLORIDE 600; 310; 30; 20 MG/100ML; MG/100ML; MG/100ML; MG/100ML
125 INJECTION, SOLUTION INTRAVENOUS ONCE
Status: COMPLETED | OUTPATIENT
Start: 2022-11-15 | End: 2022-11-15

## 2022-11-15 RX ORDER — PROPOFOL 10 MG/ML
VIAL (ML) INTRAVENOUS AS NEEDED
Status: DISCONTINUED | OUTPATIENT
Start: 2022-11-15 | End: 2022-11-15 | Stop reason: SURG

## 2022-11-15 RX ORDER — SODIUM CHLORIDE 0.9 % (FLUSH) 0.9 %
10 SYRINGE (ML) INJECTION EVERY 12 HOURS SCHEDULED
Status: DISCONTINUED | OUTPATIENT
Start: 2022-11-15 | End: 2022-11-15 | Stop reason: HOSPADM

## 2022-11-15 RX ORDER — SODIUM CHLORIDE, SODIUM LACTATE, POTASSIUM CHLORIDE, CALCIUM CHLORIDE 600; 310; 30; 20 MG/100ML; MG/100ML; MG/100ML; MG/100ML
100 INJECTION, SOLUTION INTRAVENOUS ONCE AS NEEDED
Status: DISCONTINUED | OUTPATIENT
Start: 2022-11-15 | End: 2022-11-15 | Stop reason: HOSPADM

## 2022-11-15 RX ORDER — SODIUM CHLORIDE 0.9 % (FLUSH) 0.9 %
10 SYRINGE (ML) INJECTION AS NEEDED
Status: DISCONTINUED | OUTPATIENT
Start: 2022-11-15 | End: 2022-11-15 | Stop reason: HOSPADM

## 2022-11-15 RX ORDER — FENTANYL CITRATE 50 UG/ML
INJECTION, SOLUTION INTRAMUSCULAR; INTRAVENOUS AS NEEDED
Status: DISCONTINUED | OUTPATIENT
Start: 2022-11-15 | End: 2022-11-15 | Stop reason: SURG

## 2022-11-15 RX ORDER — MIDAZOLAM HYDROCHLORIDE 1 MG/ML
1 INJECTION INTRAMUSCULAR; INTRAVENOUS
Status: DISCONTINUED | OUTPATIENT
Start: 2022-11-15 | End: 2022-11-15 | Stop reason: HOSPADM

## 2022-11-15 RX ADMIN — PROPOFOL 50 MG: 10 INJECTION, EMULSION INTRAVENOUS at 12:30

## 2022-11-15 RX ADMIN — PROPOFOL 50 MG: 10 INJECTION, EMULSION INTRAVENOUS at 12:40

## 2022-11-15 RX ADMIN — LIDOCAINE HYDROCHLORIDE 100 MG: 20 INJECTION, SOLUTION EPIDURAL; INFILTRATION; INTRACAUDAL; PERINEURAL at 12:16

## 2022-11-15 RX ADMIN — PROPOFOL 50 MG: 10 INJECTION, EMULSION INTRAVENOUS at 12:25

## 2022-11-15 RX ADMIN — FENTANYL CITRATE 100 MCG: 50 INJECTION INTRAMUSCULAR; INTRAVENOUS at 12:16

## 2022-11-15 RX ADMIN — PROPOFOL 100 MG: 10 INJECTION, EMULSION INTRAVENOUS at 12:35

## 2022-11-15 RX ADMIN — SODIUM CHLORIDE, POTASSIUM CHLORIDE, SODIUM LACTATE AND CALCIUM CHLORIDE: 600; 310; 30; 20 INJECTION, SOLUTION INTRAVENOUS at 12:16

## 2022-11-15 RX ADMIN — PROPOFOL 100 MG: 10 INJECTION, EMULSION INTRAVENOUS at 12:20

## 2022-11-15 NOTE — ANESTHESIA PREPROCEDURE EVALUATION
Anesthesia Evaluation     no history of anesthetic complications:  NPO Solid Status: > 8 hours  NPO Liquid Status: > 8 hours           Airway   Mallampati: II  TM distance: >3 FB  Neck ROM: full  No difficulty expected  Dental - normal exam     Pulmonary - normal exam   Cardiovascular - normal exam    (+) dysrhythmias Atrial Fib,       Neuro/Psych  (+) CVA, headaches, numbness,    GI/Hepatic/Renal/Endo    (+)  GERD,      Musculoskeletal     (+) back pain,   Abdominal  - normal exam    Bowel sounds: normal.   Substance History      OB/GYN          Other                        Anesthesia Plan    ASA 3     general     intravenous induction     Anesthetic plan, risks, benefits, and alternatives have been provided, discussed and informed consent has been obtained with: patient.        CODE STATUS:

## 2022-11-15 NOTE — H&P
[facility]    Patient Identification:  Name:  Madelyn Velazquez  Age:  47 y.o.  Sex:  female  :  1975  MRN:  [id number]   Visit Number:  [id number]  Primary Care Physician:  Francisco Butt MD       Chief complaint: Dysphagia and screening colonoscopy    History of presenting illness:  47 y.o. female presents today for EGD and colonoscopy secondary to dysphagia and screening colonoscopy.  The patient complains of longstanding history of difficulty swallowing both liquids and solids.  She did undergo a barium swallow on 2022.  The patient apparently swallowed a barium tablet without difficulty.  She did have a small hiatal hernia and gastroesophageal reflux.  The patient is on both omeprazole 40 mg twice daily and Pepcid twice daily.  She has had no associated weight loss with the dysphagia.  The patient does have periodic nausea and vomiting.  She has had her esophagus stretched in the past.  She will undergo screening colonoscopy today.  There is no family history of colon cancer.  ---------------------------------------------------------------------------------------------------------------------   Review of Systems   Constitutional: Negative for activity change, appetite change, chills, fatigue, fever and unexpected weight change.   HENT: Positive for trouble swallowing. Negative for mouth sores.    Eyes: Negative for photophobia, pain and redness.   Respiratory: Negative for cough and choking.    Cardiovascular: Negative for chest pain and leg swelling.   Gastrointestinal: Negative for abdominal distention, abdominal pain, anal bleeding, constipation, diarrhea, nausea, rectal pain and vomiting.   Endocrine: Negative for cold intolerance, heat intolerance and polyphagia.   Genitourinary: Negative for difficulty urinating and dysuria.   Musculoskeletal: Negative for arthralgias, joint swelling and myalgias.   Skin: Negative for rash and wound.    Allergic/Immunologic: Negative for environmental allergies and food allergies.   Neurological: Negative for dizziness and light-headedness.   Hematological: Negative for adenopathy. Does not bruise/bleed easily.   Psychiatric/Behavioral: Negative for sleep disturbance. The patient is not nervous/anxious.       ---------------------------------------------------------------------------------------------------------------------   Past Medical History:   Diagnosis Date   • A-fib (HCC)    • Arrhythmia     ATRIAL FIB   • Back pain    • CTS (carpal tunnel syndrome)    • Diabetes mellitus (HCC)    • Elevated cholesterol    • GERD (gastroesophageal reflux disease)    • Gout    • Hypertension    • Migraine    • Osteoarthritis    • Stroke (HCC)     DEC 30 2018     Past Surgical History:   Procedure Laterality Date   • CARPAL TUNNEL RELEASE Left    • CHOLECYSTECTOMY     • COLONOSCOPY     • ENDOSCOPY     • HAND SURGERY Left    • LEG SURGERY Left    • OVARIAN CYST SURGERY       Family History   Problem Relation Age of Onset   • Osteoarthritis Mother    • Heart disease Mother    • Hypertension Mother    • Osteoarthritis Father    • Hypertension Father    • Osteoarthritis Sister    • Rheum arthritis Sister    • Heart disease Sister    • Hypertension Sister    • Migraines Sister    • Osteoarthritis Brother    • Heart disease Brother    • Hypertension Brother    • Osteoarthritis Maternal Grandmother    • Heart disease Maternal Grandmother    • Hypertension Maternal Grandmother    • Osteoarthritis Maternal Grandfather    • Heart disease Maternal Grandfather    • Hypertension Maternal Grandfather    • Breast cancer Neg Hx      Social History     Socioeconomic History   • Marital status:    Tobacco Use   • Smoking status: Never   • Smokeless tobacco: Never   Vaping Use   • Vaping Use: Never used   Substance and Sexual Activity   • Alcohol use: No   • Drug use: No   • Sexual activity: Defer      ---------------------------------------------------------------------------------------------------------------------   Allergies:  Colchicine, Ultram [tramadol hcl], Latex, Tape, and Metformin  ---------------------------------------------------------------------------------------------------------------------   Prior to Admission Medications     Prescriptions Last Dose Informant Patient Reported? Taking?    allopurinol (ZYLOPRIM) 100 MG tablet 11/14/2022  Yes Yes    Take 1 tablet by mouth Daily.    apixaban (ELIQUIS) 5 MG tablet tablet 11/12/2022  No No    Take 1 tablet by mouth Every 12 (Twelve) Hours.    aspirin 81 MG chewable tablet 11/12/2022  No No    Chew 1 tablet Daily.    atorvastatin (LIPITOR) 80 MG tablet 11/14/2022  No Yes    Take 1 tablet by mouth Every Night.    cholecalciferol (VITAMIN D3) 400 units tablet 11/14/2022  Yes Yes    Take 400 Units by mouth Daily.    dofetilide (TIKOSYN) 500 MCG capsule 11/15/2022  No Yes    Take 1 capsule by mouth Every 12 (Twelve) Hours.    famotidine (PEPCID) 40 MG tablet 11/14/2022  Yes Yes    Take 1 tablet by mouth 2 (Two) Times a Day.    gabapentin (NEURONTIN) 800 MG tablet 11/14/2022  Yes Yes    Take 800 mg by mouth 3 (Three) Times a Day.    Loratadine 10 MG capsule 11/14/2022  Yes Yes    Take  by mouth.    metoprolol tartrate (LOPRESSOR) 25 MG tablet 11/15/2022  Yes Yes    Take 25 mg by mouth 2 (Two) Times a Day.    multivitamin with minerals tablet tablet Past Week  Yes Yes    Take 1 tablet by mouth Daily.    omeprazole (priLOSEC) 20 MG capsule 11/14/2022  Yes Yes    Take 40 mg by mouth.    oxyCODONE-acetaminophen (PERCOCET)  MG per tablet 11/14/2022  Yes Yes    Take 1 tablet by mouth Every 8 (Eight) Hours.    Semaglutide, 1 MG/DOSE, (Ozempic, 1 MG/DOSE,) 2 MG/1.5ML solution pen-injector 11/8/2022  Yes No    Inject  under the skin into the appropriate area as directed 1 (One) Time Per Week.    SITagliptin (JANUVIA) 100 MG tablet 11/14/2022 Self Yes Yes     Take 100 mg by mouth Daily.    tiZANidine (ZANAFLEX) 2 MG tablet 11/14/2022  Yes Yes    Take 1 tablet by mouth Every 8 (Eight) Hours.        Hospital Scheduled Meds:  lactated ringers, 125 mL/hr, Intravenous, Once  sodium chloride, 10 mL, Intravenous, Q12H         ---------------------------------------------------------------------------------------------------------------------   Vital Signs:  Temp:  [98.2 °F (36.8 °C)] 98.2 °F (36.8 °C)  Heart Rate:  [77] 77  Resp:  [20] 20  BP: (152)/(83) 152/83      11/15/22  1114   Weight: 126 kg (277 lb)     Body mass index is 47.55 kg/m².  ---------------------------------------------------------------------------------------------------------------------   Physical Exam:  Constitutional:  Well-developed and well-nourished.  No respiratory distress.  Morbidly obese      HENT:  Head: Normocephalic and atraumatic.  Mouth:  Moist mucous membranes.    Eyes:  Conjunctivae and EOM are normal.  Pupils are equal, round, and reactive to light.  No scleral icterus.  Neck:  Neck supple.  No JVD present.    Cardiovascular:  Normal rate, regular rhythm and normal heart sounds with no murmur.  Pulmonary/Chest:  No respiratory distress, no wheezes, no crackles, with normal breath sounds and good air movement.  Abdominal:  Soft.  Bowel sounds are normal.  No distension and no tenderness.   Musculoskeletal:  No edema, no tenderness, and no deformity.  No red or swollen joints anywhere.    Neurological:  Alert and oriented to person, place, and time.  No cranial nerve deficit.  No tongue deviation.  No facial droop.  No slurred speech.   Skin:  Skin is warm and dry.  No rash noted.  No pallor.   Psychiatric:  Normal mood and affect.  Behavior is normal.  Judgment and thought content normal.   Peripheral vascular:  No edema and strong pulses on all 4  extremities.  Genitourinary:  ---------------------------------------------------------------------------------------------------------------------        Invalid input(s): PROTCrCl cannot be calculated (Patient's most recent lab result is older than the maximum 30 days allowed.).  No results found for: AMMONIA          Lab Results   Component Value Date    HGBA1C 6.60 (H) 12/31/2018     Lab Results   Component Value Date    TSH 1.310 05/02/2019     Lab Results   Component Value Date    PREGTESTUR Negative 04/02/2015     Pain Management Panel    There is no flowsheet data to display.       No results found for: BLOODCX  No results found for: URINECX  No results found for: WOUNDCX  No results found for: STOOLCX      ---------------------------------------------------------------------------------------------------------------------  Imaging Results (Last 7 Days)     ** No results found for the last 168 hours. **          I have personally reviewed the radiology images and read the final radiology report.  ---------------------------------------------------------------------------------------------------------------------  Assessment and Plan:  Proceed with EGD and colonoscopy secondary to dysphagia to both liquids and solids and screening colonoscopy.    Kim Estes MD  11/15/22  11:36 EST

## 2022-11-15 NOTE — ANESTHESIA POSTPROCEDURE EVALUATION
Patient: Madelyn Velazquez    Procedure Summary     Date: 11/15/22 Room / Location: [facility] OR [address] [place] [address] COR OR    Anesthesia Start: 1216 Anesthesia Stop: 1245    Procedures:       ESOPHAGOGASTRODUODENOSCOPY (Esophagus)      COLONOSCOPY Diagnosis:       Esophageal dysphagia      Gastroesophageal reflux disease, unspecified whether esophagitis present      (Esophageal dysphagia [R13.19])      (Gastroesophageal reflux disease, unspecified whether esophagitis present [K21.9])    Surgeons: Kim Estes MD Provider: John Deleon MD    Anesthesia Type: general ASA Status: 3          Anesthesia Type: general    Vitals  Vitals Value Taken Time   /78 11/15/22 1326   Temp 97.8 °F (36.6 °C) 11/15/22 1326   Pulse 81 11/15/22 1326   Resp 18 11/15/22 1326   SpO2 97 % 11/15/22 1326           Post Anesthesia Care and Evaluation    Patient location during evaluation: PHASE II  Patient participation: complete - patient participated  Level of consciousness: awake and alert  Pain score: 1  Pain management: adequate    Airway patency: patent  Anesthetic complications: No anesthetic complications  PONV Status: controlled  Cardiovascular status: acceptable  Respiratory status: acceptable  Hydration status: acceptable

## 2022-11-18 LAB — REF LAB TEST METHOD: NORMAL

## 2022-11-21 NOTE — PROGRESS NOTES
At the time of your recent upper endoscopy, biopsies of the esophagus revealed reflux esophagitis.  Please continue prescribed Dexilant.Biopsies of the stomach were negative for h. Pylori gastritis.  Biopsies of the small bowel were negative for celiac disease. Your colonoscopy was normal.  You will need repeat colonoscopy in 10 years.

## 2023-04-06 DIAGNOSIS — M25.572 LEFT ANKLE PAIN, UNSPECIFIED CHRONICITY: Primary | ICD-10-CM

## 2023-04-19 ENCOUNTER — OFFICE VISIT (OUTPATIENT)
Dept: ORTHOPEDIC SURGERY | Facility: CLINIC | Age: 48
End: 2023-04-19
Payer: MEDICARE

## 2023-04-19 ENCOUNTER — HOSPITAL ENCOUNTER (OUTPATIENT)
Dept: GENERAL RADIOLOGY | Facility: HOSPITAL | Age: 48
Discharge: HOME | End: 2023-04-19
Admitting: ORTHOPAEDIC SURGERY
Payer: MEDICARE

## 2023-04-19 DIAGNOSIS — M25.572 LEFT ANKLE PAIN, UNSPECIFIED CHRONICITY: ICD-10-CM

## 2023-04-19 DIAGNOSIS — M72.2 PLANTAR FASCIITIS, LEFT: Primary | ICD-10-CM

## 2023-04-19 PROCEDURE — 73610 X-RAY EXAM OF ANKLE: CPT | Performed by: RADIOLOGY

## 2023-04-19 PROCEDURE — 73610 X-RAY EXAM OF ANKLE: CPT

## 2023-04-19 RX ORDER — CLONAZEPAM 0.5 MG/1
TABLET ORAL
COMMUNITY
Start: 2023-04-04

## 2023-04-19 RX ORDER — HYDROXYZINE HYDROCHLORIDE 25 MG/1
TABLET, FILM COATED ORAL
COMMUNITY
Start: 2023-03-07

## 2023-04-19 RX ORDER — OMEPRAZOLE 40 MG/1
CAPSULE, DELAYED RELEASE ORAL
COMMUNITY
Start: 2023-03-23

## 2023-04-19 RX ORDER — FAMOTIDINE 20 MG/1
TABLET, FILM COATED ORAL
COMMUNITY
Start: 2023-03-23

## 2023-04-19 RX ORDER — GLIPIZIDE 10 MG/1
TABLET ORAL
COMMUNITY
Start: 2023-03-23

## 2023-04-19 RX ADMIN — LIDOCAINE HYDROCHLORIDE 5 ML: 20 INJECTION, SOLUTION INFILTRATION; PERINEURAL at 10:02

## 2023-04-19 RX ADMIN — METHYLPREDNISOLONE ACETATE 40 MG: 40 INJECTION, SUSPENSION INTRA-ARTICULAR; INTRALESIONAL; INTRAMUSCULAR; SOFT TISSUE at 10:02

## 2023-04-19 NOTE — PROGRESS NOTES
New Patient Visit      Patient: Madelyn Velazquez  YOB: 1975  Date of Encounter: 04/19/2023        Chief Complaint:   Chief Complaint   Patient presents with   • Left Ankle - Initial Evaluation, Pain, Numbness, Tingling           HPI:   Madelyn Velazquez, 47 y.o. female, referred by Francisco Butt MD presents for evaluation of left foot and ankle pain she relates to previous ankle ORIF subsequent hardware removal.  Reports 6 months of worsening pain with greatest discomfort felt with ambulation in her heel.  She has noticed mild swelling around her left foot and ankle.  She reports no trauma.  Past medical history is marked for atrial fibrillation, ischemic stroke, type 2 diabetes and gout.        Active Problem List:  Patient Active Problem List   Diagnosis   • Pain in finger of right hand   • Dysarthria   • Ischemic stroke   • Paroxysmal atrial fibrillation   • Osteoarthritis   • Hemoglobin A1c less than 7.0%   • Chronic atrial fibrillation   • Expressive aphasia   • Type 2 diabetes mellitus without complication, without long-term current use of insulin   • Atrial fibrillation   • Esophageal dysphagia   • Gastroesophageal reflux disease           Past Medical History:  Past Medical History:   Diagnosis Date   • A-fib    • Arrhythmia     ATRIAL FIB   • Back pain    • CTS (carpal tunnel syndrome)    • Diabetes mellitus    • Elevated cholesterol    • GERD (gastroesophageal reflux disease)    • Gout    • Hypertension    • Migraine    • Osteoarthritis    • Stroke     DEC 30 2018           Past Surgical History:  Past Surgical History:   Procedure Laterality Date   • CARPAL TUNNEL RELEASE Left    • CHOLECYSTECTOMY     • COLONOSCOPY     • COLONOSCOPY N/A 11/15/2022    Procedure: COLONOSCOPY;  Surgeon: Kim Estes MD;  Location: [facility];  Service: Gastroenterology;  Laterality: N/A;   • ENDOSCOPY     • ENDOSCOPY N/A 11/15/2022    Procedure: ESOPHAGOGASTRODUODENOSCOPY;  Surgeon:  Kim Estes MD;  Location: [facility];  Service: Gastroenterology;  Laterality: N/A;   • HAND SURGERY Left    • LEG SURGERY Left    • OVARIAN CYST SURGERY             Family History:  Family History   Problem Relation Age of Onset   • Osteoarthritis Mother    • Heart disease Mother    • Hypertension Mother    • Osteoarthritis Father    • Hypertension Father    • Osteoarthritis Sister    • Rheum arthritis Sister    • Heart disease Sister    • Hypertension Sister    • Migraines Sister    • Osteoarthritis Brother    • Heart disease Brother    • Hypertension Brother    • Osteoarthritis Maternal Grandmother    • Heart disease Maternal Grandmother    • Hypertension Maternal Grandmother    • Osteoarthritis Maternal Grandfather    • Heart disease Maternal Grandfather    • Hypertension Maternal Grandfather    • Breast cancer Neg Hx          Social History:  Social History     Socioeconomic History   • Marital status:    Tobacco Use   • Smoking status: Never   • Smokeless tobacco: Never   Vaping Use   • Vaping Use: Never used   Substance and Sexual Activity   • Alcohol use: No   • Drug use: No   • Sexual activity: Defer     Body mass index is 47.55 kg/m².      Medications:  Current Outpatient Medications   Medication Sig Dispense Refill   • allopurinol (ZYLOPRIM) 300 MG tablet Take 1 tablet by mouth Daily.     • apixaban (ELIQUIS) 5 MG tablet tablet Take 1 tablet by mouth Every 12 (Twelve) Hours. 60 tablet 1   • aspirin 81 MG chewable tablet Chew 1 tablet Daily. 90 tablet 2   • atorvastatin (LIPITOR) 80 MG tablet Take 1 tablet by mouth Every Night. 90 tablet 2   • cholecalciferol (VITAMIN D3) 400 units tablet Take 1 tablet by mouth Daily.     • dexlansoprazole (Dexilant) 60 MG capsule Take 1 capsule by mouth Daily. 30 capsule 5   • dofetilide (TIKOSYN) 500 MCG capsule Take 1 capsule by mouth Every 12 (Twelve) Hours. 60 capsule 11   • gabapentin (NEURONTIN) 800 MG tablet Take 1 tablet by mouth 3 (Three)  Times a Day.     • Loratadine 10 MG capsule Take  by mouth.     • metoprolol tartrate (LOPRESSOR) 25 MG tablet Take 1 tablet by mouth 2 (Two) Times a Day.     • multivitamin with minerals tablet tablet Take 1 tablet by mouth Daily.     • oxyCODONE-acetaminophen (PERCOCET)  MG per tablet Take 1 tablet by mouth Every 8 (Eight) Hours.     • Semaglutide, 1 MG/DOSE, (Ozempic, 1 MG/DOSE,) 2 MG/1.5ML solution pen-injector Inject  under the skin into the appropriate area as directed 1 (One) Time Per Week.     • SITagliptin (JANUVIA) 100 MG tablet Take 1 tablet by mouth Daily.     • tiZANidine (ZANAFLEX) 2 MG tablet Take 1 tablet by mouth Every 8 (Eight) Hours.     • clonazePAM (KlonoPIN) 0.5 MG tablet      • Diclofenac Sodium (VOLTAREN) 1 % gel gel      • famotidine (PEPCID) 20 MG tablet      • glipizide (GLUCOTROL) 10 MG tablet      • hydrOXYzine (ATARAX) 25 MG tablet      • omeprazole (priLOSEC) 40 MG capsule        No current facility-administered medications for this visit.         Allergies:  Allergies   Allergen Reactions   • Colchicine Rash   • Ultram [Tramadol Hcl] Nausea And Vomiting   • Cefdinir Rash   • Doxycycline Rash   • Indomethacin Rash   • Latex Hives   • Metformin Rash   • Tape Hives         Review of Systems:   Review of Systems   Constitutional: Negative.  Negative for chills, fatigue and fever.   HENT: Positive for sinus pain. Negative for congestion, ear pain, facial swelling, sore throat, trouble swallowing and voice change.    Eyes: Negative for pain, discharge, redness and visual disturbance.   Respiratory: Positive for cough. Negative for apnea, choking, chest tightness, shortness of breath, wheezing and stridor.    Cardiovascular: Negative.  Negative for chest pain, palpitations and leg swelling.   Gastrointestinal: Negative.  Negative for abdominal distention, abdominal pain, blood in stool, nausea and vomiting.   Endocrine: Negative.  Negative for cold intolerance, heat intolerance,  "polydipsia and polyphagia.   Genitourinary: Negative.  Negative for difficulty urinating, dysuria, flank pain, frequency and hematuria.   Musculoskeletal: Positive for arthralgias, back pain and joint swelling.   Skin: Negative.  Negative for color change, pallor, rash and wound.   Allergic/Immunologic: Negative.  Negative for environmental allergies, food allergies and immunocompromised state.   Neurological: Positive for numbness. Negative for dizziness, tremors, seizures, syncope, speech difficulty, weakness, light-headedness and headaches.   Hematological: Negative.  Negative for adenopathy. Does not bruise/bleed easily.   Psychiatric/Behavioral: Negative.  Negative for behavioral problems, confusion, dysphoric mood, self-injury, sleep disturbance and suicidal ideas. The patient is not nervous/anxious.          Physical Exam:   Physical Exam  GENERAL: 47 y.o. female, alert and oriented X 3 in no acute distress.   Visit Vitals  /82   Pulse 75   Ht 162.6 cm (64\")   Wt 126 kg (277 lb)   BMI 47.55 kg/m²       GENERAL APPEARANCE: Awake, alert & oriented, in no acute distress and well developed, well nourished.   PSYCH: Normal mood and affect  LUNGS: Breathing nonlabored, no wheezing  EYES: Sclera anicteric, pupils equal  CARDIOVASCULAR: Palpable pulses. Capillary refill less than 2 seconds  INTEGUMENTARY: Skin intact, co clubbing, cyanosis  NEUROLOGIC: Normal Sensation  MUSCULOSKELETAL:  Orthopedic Examination:   Left foot reveals normal subtalar motion exquisite tenderness at the insertion of plantar fascia.  Ankle mobility is full no localized swelling or tenderness.  Neurovascular exam grossly intact.          Radiology/Labs:     XR Ankle 3+ View Left    Result Date: 4/20/2023  1.  Degenerative change of the ankle. 2.  Soft tissue swelling noted.  This report was finalized on 4/20/2023 8:05 AM by Dr. Jason Storm MD.        Radiographs left ankle obtained today shows evidence of previous ankle ORIF with " "hardware removal.  The ankle more T's joint space is preserved and no abnormality seen.      Assessment & Plan:   47 y.o. female presents clinical findings consistent with plantar fasciitis left foot do not see evidence of ankle arthrosis related to previous fracture we discussed options today she was provided steroid injection Depo-Medrol 40 mg lidocaine block at the insertion of the plantar fascia she did have immediate relief she is instructed on stretching, appropriate shoewear if she is encouraged to obtain.  She will follow-up in the future depending on her relief and response.        ICD-10-CM ICD-9-CM   1. Plantar fasciitis, left  M72.2 728.71         - Injection Tendon or Ligament    Date/Time: 4/19/2023 10:02 AM  Performed by: Gerson Andres MD  Authorized by: Gerson Andres MD   Consent: Verbal consent obtained. Written consent obtained.  Risks and benefits: risks, benefits and alternatives were discussed  Consent given by: patient  Patient understanding: patient states understanding of the procedure being performed  Patient consent: the patient's understanding of the procedure matches consent given  Procedure consent: procedure consent matches procedure scheduled  Test results: test results available and properly labeled  Site marked: the operative site was marked  Imaging studies: imaging studies available  Patient identity confirmed: verbally with patient  Time out: Immediately prior to procedure a \"time out\" was called to verify the correct patient, procedure, equipment, support staff and site/side marked as required.  Preparation: Patient was prepped and draped in the usual sterile fashion.  Local anesthesia used: yes  Anesthesia: local infiltration    Anesthesia:  Local anesthesia used: yes  Anesthetic total: 5 mL    Sedation:  Patient sedated: no    Patient tolerance: Patient tolerated the procedure well with no immediate complications  Medications administered: 5 mL lidocaine 2%; " 40 mg methylPREDNISolone acetate 40 MG/ML            Cc:   Francisco Butt MD                This document has been electronically signed by Gerson Adnres MD   April 20, 2023 10:01 EDT

## 2023-04-20 VITALS
DIASTOLIC BLOOD PRESSURE: 82 MMHG | BODY MASS INDEX: 47.29 KG/M2 | SYSTOLIC BLOOD PRESSURE: 132 MMHG | HEIGHT: 64 IN | WEIGHT: 277 LBS | HEART RATE: 75 BPM

## 2023-04-22 RX ORDER — LIDOCAINE HYDROCHLORIDE 20 MG/ML
5 INJECTION, SOLUTION INFILTRATION; PERINEURAL
Status: COMPLETED | OUTPATIENT
Start: 2023-04-19 | End: 2023-04-19

## 2023-04-22 RX ORDER — METHYLPREDNISOLONE ACETATE 40 MG/ML
40 INJECTION, SUSPENSION INTRA-ARTICULAR; INTRALESIONAL; INTRAMUSCULAR; SOFT TISSUE
Status: COMPLETED | OUTPATIENT
Start: 2023-04-19 | End: 2023-04-19

## 2023-06-07 ENCOUNTER — OFFICE VISIT (OUTPATIENT)
Dept: ORTHOPEDIC SURGERY | Facility: CLINIC | Age: 48
End: 2023-06-07
Payer: MEDICARE

## 2023-06-07 VITALS — BODY MASS INDEX: 47.42 KG/M2 | WEIGHT: 277.78 LBS | HEIGHT: 64 IN

## 2023-06-07 DIAGNOSIS — M72.2 PLANTAR FASCIITIS, LEFT: Primary | ICD-10-CM

## 2023-06-07 RX ADMIN — LIDOCAINE HYDROCHLORIDE 5 ML: 20 INJECTION, SOLUTION INFILTRATION; PERINEURAL at 22:39

## 2023-06-07 RX ADMIN — METHYLPREDNISOLONE ACETATE 40 MG: 40 INJECTION, SUSPENSION INTRA-ARTICULAR; INTRALESIONAL; INTRAMUSCULAR; SOFT TISSUE at 22:39

## 2023-06-07 NOTE — PROGRESS NOTES
Follow-up Visit         Patient: Madelyn Velazquez  YOB: 1975  Date of Encounter: 06/07/2023      Chief  Complaint:   Chief Complaint   Patient presents with   • Left Heel - Pain, Numbness         HPI:  Madelyn Velazquez, 47 y.o. female presents in follow-up foot pain provided steroid injection for plantar fasciitis April 19, 2023.  She reports no relief describes pain at times unbearable.        Medical History:  Patient Active Problem List   Diagnosis   • Pain in finger of right hand   • Dysarthria   • Ischemic stroke   • Paroxysmal atrial fibrillation   • Osteoarthritis   • Hemoglobin A1c less than 7.0%   • Chronic atrial fibrillation   • Expressive aphasia   • Type 2 diabetes mellitus without complication, without long-term current use of insulin   • Atrial fibrillation   • Esophageal dysphagia   • Gastroesophageal reflux disease     Past Medical History:   Diagnosis Date   • A-fib    • Arrhythmia     ATRIAL FIB   • Back pain    • CTS (carpal tunnel syndrome)    • Diabetes mellitus    • Elevated cholesterol    • GERD (gastroesophageal reflux disease)    • Gout    • Hypertension    • Migraine    • Osteoarthritis    • Stroke     DEC 30 2018           Social History:  Social History     Socioeconomic History   • Marital status:    Tobacco Use   • Smoking status: Never   • Smokeless tobacco: Never   Vaping Use   • Vaping Use: Never used   Substance and Sexual Activity   • Alcohol use: No   • Drug use: No   • Sexual activity: Defer           Current Medications:    Current Outpatient Medications:   •  allopurinol (ZYLOPRIM) 300 MG tablet, Take 1 tablet by mouth Daily., Disp: , Rfl:   •  apixaban (ELIQUIS) 5 MG tablet tablet, Take 1 tablet by mouth Every 12 (Twelve) Hours., Disp: 60 tablet, Rfl: 1  •  aspirin 81 MG chewable tablet, Chew 1 tablet Daily., Disp: 90 tablet, Rfl: 2  •  atorvastatin (LIPITOR) 80 MG tablet, Take 1 tablet by mouth Every Night., Disp: 90 tablet, Rfl: 2  •   cholecalciferol (VITAMIN D3) 400 units tablet, Take 1 tablet by mouth Daily., Disp: , Rfl:   •  clonazePAM (KlonoPIN) 0.5 MG tablet, , Disp: , Rfl:   •  dexlansoprazole (Dexilant) 60 MG capsule, Take 1 capsule by mouth Daily., Disp: 30 capsule, Rfl: 5  •  Diclofenac Sodium (VOLTAREN) 1 % gel gel, , Disp: , Rfl:   •  dofetilide (TIKOSYN) 500 MCG capsule, Take 1 capsule by mouth Every 12 (Twelve) Hours., Disp: 60 capsule, Rfl: 11  •  famotidine (PEPCID) 20 MG tablet, , Disp: , Rfl:   •  gabapentin (NEURONTIN) 800 MG tablet, Take 1 tablet by mouth 3 (Three) Times a Day., Disp: , Rfl:   •  glipizide (GLUCOTROL) 10 MG tablet, , Disp: , Rfl:   •  hydrOXYzine (ATARAX) 25 MG tablet, , Disp: , Rfl:   •  Loratadine 10 MG capsule, Take  by mouth., Disp: , Rfl:   •  metoprolol tartrate (LOPRESSOR) 25 MG tablet, Take 1 tablet by mouth 2 (Two) Times a Day., Disp: , Rfl:   •  multivitamin with minerals tablet tablet, Take 1 tablet by mouth Daily., Disp: , Rfl:   •  omeprazole (priLOSEC) 40 MG capsule, , Disp: , Rfl:   •  oxyCODONE-acetaminophen (PERCOCET)  MG per tablet, Take 1 tablet by mouth Every 8 (Eight) Hours., Disp: , Rfl:   •  Semaglutide, 1 MG/DOSE, (Ozempic, 1 MG/DOSE,) 2 MG/1.5ML solution pen-injector, Inject  under the skin into the appropriate area as directed 1 (One) Time Per Week., Disp: , Rfl:   •  SITagliptin (JANUVIA) 100 MG tablet, Take 1 tablet by mouth Daily., Disp: , Rfl:   •  tiZANidine (ZANAFLEX) 2 MG tablet, Take 1 tablet by mouth Every 8 (Eight) Hours., Disp: , Rfl:         Allergies:  Allergies   Allergen Reactions   • Colchicine Rash   • Ultram [Tramadol Hcl] Nausea And Vomiting   • Cefdinir Rash   • Doxycycline Rash   • Indomethacin Rash   • Latex Hives   • Metformin Rash   • Tape Hives           Family History:  Family History   Problem Relation Age of Onset   • Osteoarthritis Mother    • Heart disease Mother    • Hypertension Mother    • Osteoarthritis Father    • Hypertension Father    •  Osteoarthritis Sister    • Rheum arthritis Sister    • Heart disease Sister    • Hypertension Sister    • Migraines Sister    • Osteoarthritis Brother    • Heart disease Brother    • Hypertension Brother    • Osteoarthritis Maternal Grandmother    • Heart disease Maternal Grandmother    • Hypertension Maternal Grandmother    • Osteoarthritis Maternal Grandfather    • Heart disease Maternal Grandfather    • Hypertension Maternal Grandfather    • Breast cancer Neg Hx            Surgical History:  Past Surgical History:   Procedure Laterality Date   • CARPAL TUNNEL RELEASE Left    • CHOLECYSTECTOMY     • COLONOSCOPY     • COLONOSCOPY N/A 11/15/2022    Procedure: COLONOSCOPY;  Surgeon: Kim Estes MD;  Location: [facility];  Service: Gastroenterology;  Laterality: N/A;   • ENDOSCOPY     • ENDOSCOPY N/A 11/15/2022    Procedure: ESOPHAGOGASTRODUODENOSCOPY;  Surgeon: Kim Estes MD;  Location: [facility];  Service: Gastroenterology;  Laterality: N/A;   • HAND SURGERY Left    • LEG SURGERY Left    • OVARIAN CYST SURGERY           Orthopedic Examination: Foot is exquisite tenderness at the plantar fascia left heel.  She has normal subtalar motion.          Assessment & Plan:   47 y.o. female presents with plantar fasciitis left foot she did not respond to steroid injection provided April 19, 2023.  Today she is provided Depo-Medrol 40 mg insertion plantar fascia left foot.  She will follow-up with residual symptoms or no relief.           Diagnosis Plan   1. Plantar fasciitis, left              - Injection Tendon or Ligament    Date/Time: 6/7/2023 10:39 PM  Performed by: Gerson Andres MD  Authorized by: Gerson Andres MD   Consent: Verbal consent obtained. Written consent obtained.  Risks and benefits: risks, benefits and alternatives were discussed  Consent given by: patient  Patient understanding: patient states understanding of the procedure being performed  Patient consent:  "the patient's understanding of the procedure matches consent given  Procedure consent: procedure consent matches procedure scheduled  Test results: test results available and properly labeled  Site marked: the operative site was marked  Imaging studies: imaging studies available  Patient identity confirmed: verbally with patient  Time out: Immediately prior to procedure a \"time out\" was called to verify the correct patient, procedure, equipment, support staff and site/side marked as required.  Preparation: Patient was prepped and draped in the usual sterile fashion.  Local anesthesia used: yes  Anesthesia: local infiltration    Anesthesia:  Local anesthesia used: yes  Anesthetic total: 5 mL    Sedation:  Patient sedated: no    Patient tolerance: Patient tolerated the procedure well with no immediate complications  Medications administered: 5 mL lidocaine 2%; 40 mg methylPREDNISolone acetate 40 MG/ML          Cc:  Francisco Butt MD              This document has been electronically signed by Gerson Andres MD   June 7, 2023 22:38 EDT  "

## 2023-06-09 RX ORDER — LIDOCAINE HYDROCHLORIDE 20 MG/ML
5 INJECTION, SOLUTION INFILTRATION; PERINEURAL
Status: COMPLETED | OUTPATIENT
Start: 2023-06-07 | End: 2023-06-07

## 2023-06-09 RX ORDER — METHYLPREDNISOLONE ACETATE 40 MG/ML
40 INJECTION, SUSPENSION INTRA-ARTICULAR; INTRALESIONAL; INTRAMUSCULAR; SOFT TISSUE
Status: COMPLETED | OUTPATIENT
Start: 2023-06-07 | End: 2023-06-07

## 2023-06-15 ENCOUNTER — TRANSCRIBE ORDERS (OUTPATIENT)
Dept: ADMINISTRATIVE | Facility: HOSPITAL | Age: 48
End: 2023-06-15
Payer: MEDICARE

## 2023-06-15 DIAGNOSIS — G44.41 INTRACTABLE DRUG-INDUCED HEADACHE, NOT ELSEWHERE CLASSIFIED: Primary | ICD-10-CM

## 2023-08-27 ENCOUNTER — APPOINTMENT (OUTPATIENT)
Dept: GENERAL RADIOLOGY | Facility: HOSPITAL | Age: 48
End: 2023-08-27
Payer: MEDICARE

## 2023-08-27 ENCOUNTER — HOSPITAL ENCOUNTER (EMERGENCY)
Facility: HOSPITAL | Age: 48
Discharge: HOME | End: 2023-08-27
Attending: STUDENT IN AN ORGANIZED HEALTH CARE EDUCATION/TRAINING PROGRAM | Admitting: STUDENT IN AN ORGANIZED HEALTH CARE EDUCATION/TRAINING PROGRAM
Payer: MEDICARE

## 2023-08-27 VITALS
OXYGEN SATURATION: 99 % | HEIGHT: 63 IN | HEART RATE: 77 BPM | DIASTOLIC BLOOD PRESSURE: 88 MMHG | WEIGHT: 251 LBS | BODY MASS INDEX: 44.47 KG/M2 | RESPIRATION RATE: 20 BRPM | TEMPERATURE: 98.2 F | SYSTOLIC BLOOD PRESSURE: 117 MMHG

## 2023-08-27 DIAGNOSIS — S82.891A CLOSED FRACTURE OF RIGHT ANKLE, INITIAL ENCOUNTER: Primary | ICD-10-CM

## 2023-08-27 PROCEDURE — 73630 X-RAY EXAM OF FOOT: CPT

## 2023-08-27 PROCEDURE — 99283 EMERGENCY DEPT VISIT LOW MDM: CPT

## 2023-08-27 PROCEDURE — 73610 X-RAY EXAM OF ANKLE: CPT

## 2023-08-27 RX ORDER — HYDROCODONE BITARTRATE AND ACETAMINOPHEN 7.5; 325 MG/1; MG/1
1 TABLET ORAL EVERY 6 HOURS PRN
Qty: 12 TABLET | Refills: 0 | Status: SHIPPED | OUTPATIENT
Start: 2023-08-27

## 2023-08-27 RX ORDER — HYDROCODONE BITARTRATE AND ACETAMINOPHEN 7.5; 325 MG/1; MG/1
1 TABLET ORAL ONCE
Status: COMPLETED | OUTPATIENT
Start: 2023-08-27 | End: 2023-08-27

## 2023-08-27 RX ADMIN — HYDROCODONE BITARTRATE AND ACETAMINOPHEN 1 TABLET: 7.5; 325 TABLET ORAL at 12:22

## 2023-08-27 NOTE — ED PROVIDER NOTES
Subjective   History of Present Illness  46 yo female pt iwht hx of A fib, DM, GERD, HLD, and HTN presents to the ED with complaints of right ankle and foot pain. Pt states she twisted her ankle over water hose while washing car. Pt reports swelling and worsening pain with movement and ambulation. No alleviating factors.      History provided by:  Patient   used: No      Review of Systems   Constitutional: Negative.    HENT: Negative.     Eyes: Negative.    Respiratory: Negative.     Cardiovascular: Negative.    Gastrointestinal: Negative.    Endocrine: Negative.    Genitourinary: Negative.    Musculoskeletal:         +R foot and ankle pain    Skin: Negative.    Allergic/Immunologic: Negative.    Neurological: Negative.    Hematological: Negative.    Psychiatric/Behavioral: Negative.     All other systems reviewed and are negative.    Past Medical History:   Diagnosis Date    A-fib     Arrhythmia     ATRIAL FIB    Back pain     CTS (carpal tunnel syndrome)     Diabetes mellitus     Elevated cholesterol     GERD (gastroesophageal reflux disease)     Gout     Hypertension     Migraine     Osteoarthritis     Stroke     DEC 30 2018       Allergies   Allergen Reactions    Colchicine Rash    Ultram [Tramadol Hcl] Nausea And Vomiting    Cefdinir Rash    Doxycycline Rash    Indomethacin Rash    Latex Hives    Metformin Rash    Tape Hives       Past Surgical History:   Procedure Laterality Date    CARPAL TUNNEL RELEASE Left     CHOLECYSTECTOMY      COLONOSCOPY      COLONOSCOPY N/A 11/15/2022    Procedure: COLONOSCOPY;  Surgeon: Kim Estes MD;  [facility];  Service: Gastroenterology;  Laterality: N/A;    ENDOSCOPY      ENDOSCOPY N/A 11/15/2022    Procedure: ESOPHAGOGASTRODUODENOSCOPY;  Surgeon: Kim Estes MD;  [facility];  Service: Gastroenterology;  Laterality: N/A;    HAND SURGERY Left     LEG SURGERY Left     OVARIAN CYST SURGERY         Family History    Problem Relation Age of Onset    Osteoarthritis Mother     Heart disease Mother     Hypertension Mother     Osteoarthritis Father     Hypertension Father     Osteoarthritis Sister     Rheum arthritis Sister     Heart disease Sister     Hypertension Sister     Migraines Sister     Osteoarthritis Brother     Heart disease Brother     Hypertension Brother     Osteoarthritis Maternal Grandmother     Heart disease Maternal Grandmother     Hypertension Maternal Grandmother     Osteoarthritis Maternal Grandfather     Heart disease Maternal Grandfather     Hypertension Maternal Grandfather     Breast cancer Neg Hx        Social History     Socioeconomic History    Marital status:    Tobacco Use    Smoking status: Never    Smokeless tobacco: Never   Vaping Use    Vaping Use: Never used   Substance and Sexual Activity    Alcohol use: No    Drug use: No    Sexual activity: Defer           Objective   Physical Exam  Vitals and nursing note reviewed.   Constitutional:       Appearance: Normal appearance. She is normal weight.   HENT:      Head: Normocephalic and atraumatic.      Right Ear: External ear normal.      Left Ear: External ear normal.      Nose: Nose normal.      Mouth/Throat:      Mouth: Mucous membranes are moist.      Pharynx: Oropharynx is clear.   Eyes:      Extraocular Movements: Extraocular movements intact.      Conjunctiva/sclera: Conjunctivae normal.      Pupils: Pupils are equal, round, and reactive to light.   Cardiovascular:      Rate and Rhythm: Normal rate and regular rhythm.      Pulses: Normal pulses.      Heart sounds: Normal heart sounds.   Pulmonary:      Effort: Pulmonary effort is normal.      Breath sounds: Normal breath sounds.   Abdominal:      General: Abdomen is flat. Bowel sounds are normal.      Palpations: Abdomen is soft.   Musculoskeletal:      Cervical back: Normal range of motion and neck supple.      Right ankle: Swelling present. Tenderness present. Decreased range of  motion. Normal pulse.      Right foot: Swelling, tenderness and bony tenderness present. Normal pulse.   Skin:     General: Skin is warm and dry.      Capillary Refill: Capillary refill takes less than 2 seconds.   Neurological:      General: No focal deficit present.      Mental Status: She is alert and oriented to person, place, and time. Mental status is at baseline.   Psychiatric:         Mood and Affect: Mood normal.         Behavior: Behavior normal.         Thought Content: Thought content normal.         Judgment: Judgment normal.       Splint - Cast - Strapping    Date/Time: 8/27/2023 2:31 PM  Performed by: Lindy Martinez PA  Authorized by: Vladimir Allen DO     Consent:     Consent obtained:  Verbal    Consent given by:  Patient    Risks, benefits, and alternatives were discussed: yes      Risks discussed:  Discoloration, pain, numbness and swelling    Alternatives discussed:  No treatment, delayed treatment, alternative treatment, observation and referral  Universal protocol:     Procedure explained and questions answered to patient or proxy's satisfaction: yes      Relevant documents present and verified: yes      Test results available: yes      Imaging studies available: yes      Required blood products, implants, devices, and special equipment available: yes      Site/side marked: yes      Immediately prior to procedure a time out was called: yes      Patient identity confirmed:  Verbally with patient, hospital-assigned identification number, arm band and provided demographic data  Pre-procedure details:     Distal neurologic exam:  Normal    Distal perfusion: distal pulses strong    Procedure details:     Location:  Ankle    Ankle location:  R ankle    Splint type:  Short leg    Supplies:  Fiberglass and cotton padding  Post-procedure details:     Distal neurologic exam:  Normal    Distal perfusion: distal pulses strong      Procedure completion:  Tolerated well, no immediate  complications           ED Course  ED Course as of 08/27/23 1433   Sun Aug 27, 2023   1432 XR Ankle 3+ View Right  IMPRESSION:  1.  Fracture of the lateral malleolus.  2.  Extensive soft tissue swelling laterally.     This report was finalized on 8/27/2023 1:06 PM by Dr. Lonnie Garcia MD.           Specimen Collected: 08/27/23 13:05 EDT Last Resulted: 08/27/23 13:06 EDT         [ML]   1432 XR Foot 3+ View Right  IMPRESSION:    No acute findings in the right foot.     This report was finalized on 8/27/2023 1:06 PM by Dr. Lonnie Garcia MD.           Specimen Collected: 08/27/23 13:06 EDT Last Resulted: 08/27/23 13:06 EDT            [ML]      ED Course User Index  [ML] Lindy Martinez PA                                           Medical Decision Making  46 yo female pt iwht hx of A fib, DM, GERD, HLD, and HTN presents to the ED with complaints of right ankle and foot pain. Pt states she twisted her ankle over water hose while washing car. Pt reports swelling and worsening pain with movement and ambulation. No alleviating factors.      Problems Addressed:  Closed fracture of right ankle, initial encounter: complicated acute illness or injury    Amount and/or Complexity of Data Reviewed  Radiology: ordered. Decision-making details documented in ED Course.    Risk  Prescription drug management.        Final diagnoses:   Closed fracture of right ankle, initial encounter       ED Disposition  ED Disposition       ED Disposition   Discharge    Condition   Stable    Comment   --               Francisco Butt MD  [address] [place] [address]  [phone]    Schedule an appointment as soon as possible for a visit in 1 day      Colten Montaño,   [address] [id number] [phone]    Schedule an appointment as soon as possible for a visit in 1 day           Medication List        New Prescriptions      HYDROcodone-acetaminophen 7.5-325 MG per tablet  Commonly known as: NORCO  Take 1 tablet by  mouth Every 6 (Six) Hours As Needed for Moderate Pain.               Where to Get Your Medications        These medications were sent to [facility] [place] [facility] [address] [address] - [phone]  - [phone]   [address] [address] [address]      Phone: [phone]   HYDROcodone-acetaminophen 7.5-325 MG per tablet            Lindy Martinez PA  08/27/23 1435

## 2023-08-28 ENCOUNTER — TELEPHONE (OUTPATIENT)
Dept: ORTHOPEDIC SURGERY | Facility: CLINIC | Age: 48
End: 2023-08-28
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
## 2023-10-16 ENCOUNTER — HOSPITAL ENCOUNTER (OUTPATIENT)
Dept: MRI IMAGING | Facility: HOSPITAL | Age: 48
Discharge: HOME | End: 2023-10-16
Admitting: PHYSICIAN ASSISTANT
Payer: MEDICARE

## 2023-10-16 DIAGNOSIS — G44.41 INTRACTABLE DRUG-INDUCED HEADACHE, NOT ELSEWHERE CLASSIFIED: ICD-10-CM

## 2023-10-16 PROCEDURE — 70551 MRI BRAIN STEM W/O DYE: CPT | Performed by: RADIOLOGY

## 2023-10-16 PROCEDURE — 70551 MRI BRAIN STEM W/O DYE: CPT

## 2023-11-21 ENCOUNTER — TELEPHONE (OUTPATIENT)
Dept: ORTHOPEDIC SURGERY | Facility: CLINIC | Age: 48
End: 2023-11-21

## 2023-11-21 NOTE — TELEPHONE ENCOUNTER
Caller: SKIP HONG    Relationship to patient: SELF    Best call back number: [phone] (home)     Chief complaint: RIGHT ANKLE PAIN - PATIENT HAS BEEN SEEING DR BROWN FOR THIS ISSUE BUT SHE WOULD LIKE A SECOND OPINION - PROG NOTES IN CARE EVERYWHERE - XRAY 9/19/23 AND MRI 11/8/23, PATIENT ADVISED TO BRING DISC TO APPT    Type of visit: NEW PROBLEM - 2ND OPINION    Requested date: ASAP

## 2024-02-08 ENCOUNTER — TRANSCRIBE ORDERS (OUTPATIENT)
Dept: ADMINISTRATIVE | Facility: HOSPITAL | Age: 49
End: 2024-02-08
Payer: MEDICARE

## 2024-02-08 DIAGNOSIS — M25.511 CHRONIC RIGHT SHOULDER PAIN: Primary | ICD-10-CM

## 2024-02-08 DIAGNOSIS — G89.29 CHRONIC RIGHT SHOULDER PAIN: Primary | ICD-10-CM

## 2024-02-21 ENCOUNTER — HOSPITAL ENCOUNTER (OUTPATIENT)
Dept: MRI IMAGING | Facility: HOSPITAL | Age: 49
Discharge: HOME | End: 2024-02-21
Admitting: PHYSICIAN ASSISTANT
Payer: MEDICARE

## 2024-02-21 DIAGNOSIS — M25.511 CHRONIC RIGHT SHOULDER PAIN: ICD-10-CM

## 2024-02-21 DIAGNOSIS — G89.29 CHRONIC RIGHT SHOULDER PAIN: ICD-10-CM

## 2024-02-21 PROCEDURE — 73221 MRI JOINT UPR EXTREM W/O DYE: CPT

## 2024-02-22 PROCEDURE — 73221 MRI JOINT UPR EXTREM W/O DYE: CPT | Performed by: RADIOLOGY

## 2024-06-09 ENCOUNTER — HOSPITAL ENCOUNTER (OUTPATIENT)
Facility: HOSPITAL | Age: 49
Setting detail: OBSERVATION
Discharge: HOME | End: 2024-06-11
Attending: EMERGENCY MEDICINE | Admitting: INTERNAL MEDICINE
Payer: MEDICARE

## 2024-06-09 ENCOUNTER — APPOINTMENT (OUTPATIENT)
Dept: GENERAL RADIOLOGY | Facility: HOSPITAL | Age: 49
End: 2024-06-09
Payer: MEDICARE

## 2024-06-09 DIAGNOSIS — R07.89 CHEST PRESSURE: Primary | ICD-10-CM

## 2024-06-09 DIAGNOSIS — I48.91 ATRIAL FIBRILLATION, UNSPECIFIED TYPE: ICD-10-CM

## 2024-06-09 LAB
ABSOLUTE LUNG FLUID CONTENT: 43 % (ref 20–35)
ALBUMIN SERPL-MCNC: 3.7 G/DL (ref 3.5–5.2)
ALBUMIN/GLOB SERPL: 1.2 G/DL
ALP SERPL-CCNC: 86 U/L (ref 39–117)
ALT SERPL W P-5'-P-CCNC: 20 U/L (ref 1–33)
ANION GAP SERPL CALCULATED.3IONS-SCNC: 8.9 MMOL/L (ref 5–15)
AST SERPL-CCNC: 18 U/L (ref 1–32)
BASOPHILS # BLD AUTO: 0.05 10*3/MM3 (ref 0–0.2)
BASOPHILS NFR BLD AUTO: 0.5 % (ref 0–1.5)
BILIRUB SERPL-MCNC: 0.2 MG/DL (ref 0–1.2)
BUN SERPL-MCNC: 22 MG/DL (ref 6–20)
BUN/CREAT SERPL: 19.8 (ref 7–25)
CALCIUM SPEC-SCNC: 9.2 MG/DL (ref 8.6–10.5)
CHLORIDE SERPL-SCNC: 104 MMOL/L (ref 98–107)
CO2 SERPL-SCNC: 23.1 MMOL/L (ref 22–29)
CREAT SERPL-MCNC: 1.11 MG/DL (ref 0.57–1)
DEPRECATED RDW RBC AUTO: 53.1 FL (ref 37–54)
EGFRCR SERPLBLD CKD-EPI 2021: 61.4 ML/MIN/1.73
EOSINOPHIL # BLD AUTO: 0.09 10*3/MM3 (ref 0–0.4)
EOSINOPHIL NFR BLD AUTO: 1 % (ref 0.3–6.2)
ERYTHROCYTE [DISTWIDTH] IN BLOOD BY AUTOMATED COUNT: 16.2 % (ref 12.3–15.4)
GEN 5 2HR TROPONIN T REFLEX: 14 NG/L
GLOBULIN UR ELPH-MCNC: 3.1 GM/DL
GLUCOSE BLDC GLUCOMTR-MCNC: 150 MG/DL (ref 70–130)
GLUCOSE SERPL-MCNC: 157 MG/DL (ref 65–99)
HCT VFR BLD AUTO: 37.5 % (ref 34–46.6)
HGB BLD-MCNC: 11.1 G/DL (ref 12–15.9)
HOLD SPECIMEN: NORMAL
HOLD SPECIMEN: NORMAL
IMM GRANULOCYTES # BLD AUTO: 0.04 10*3/MM3 (ref 0–0.05)
IMM GRANULOCYTES NFR BLD AUTO: 0.4 % (ref 0–0.5)
LYMPHOCYTES # BLD AUTO: 2.36 10*3/MM3 (ref 0.7–3.1)
LYMPHOCYTES NFR BLD AUTO: 25.8 % (ref 19.6–45.3)
MCH RBC QN AUTO: 26.5 PG (ref 26.6–33)
MCHC RBC AUTO-ENTMCNC: 29.6 G/DL (ref 31.5–35.7)
MCV RBC AUTO: 89.5 FL (ref 79–97)
MONOCYTES # BLD AUTO: 0.77 10*3/MM3 (ref 0.1–0.9)
MONOCYTES NFR BLD AUTO: 8.4 % (ref 5–12)
NEUTROPHILS NFR BLD AUTO: 5.85 10*3/MM3 (ref 1.7–7)
NEUTROPHILS NFR BLD AUTO: 63.9 % (ref 42.7–76)
NRBC BLD AUTO-RTO: 0 /100 WBC (ref 0–0.2)
NT-PROBNP SERPL-MCNC: 4049 PG/ML (ref 0–450)
PLATELET # BLD AUTO: 262 10*3/MM3 (ref 140–450)
PMV BLD AUTO: 11.8 FL (ref 6–12)
POTASSIUM SERPL-SCNC: 4.2 MMOL/L (ref 3.5–5.2)
PROT SERPL-MCNC: 6.8 G/DL (ref 6–8.5)
QT INTERVAL: 396 MS
QTC INTERVAL: 508 MS
RBC # BLD AUTO: 4.19 10*6/MM3 (ref 3.77–5.28)
SODIUM SERPL-SCNC: 136 MMOL/L (ref 136–145)
TROPONIN T DELTA: -1 NG/L
TROPONIN T SERPL HS-MCNC: 15 NG/L
WBC NRBC COR # BLD AUTO: 9.16 10*3/MM3 (ref 3.4–10.8)
WHOLE BLOOD HOLD COAG: NORMAL
WHOLE BLOOD HOLD SPECIMEN: NORMAL

## 2024-06-09 PROCEDURE — 96374 THER/PROPH/DIAG INJ IV PUSH: CPT

## 2024-06-09 PROCEDURE — 36415 COLL VENOUS BLD VENIPUNCTURE: CPT

## 2024-06-09 PROCEDURE — 96372 THER/PROPH/DIAG INJ SC/IM: CPT

## 2024-06-09 PROCEDURE — 93010 ELECTROCARDIOGRAM REPORT: CPT | Performed by: INTERNAL MEDICINE

## 2024-06-09 PROCEDURE — 99285 EMERGENCY DEPT VISIT HI MDM: CPT

## 2024-06-09 PROCEDURE — 93005 ELECTROCARDIOGRAM TRACING: CPT | Performed by: EMERGENCY MEDICINE

## 2024-06-09 PROCEDURE — 25010000002 MORPHINE PER 10 MG

## 2024-06-09 PROCEDURE — 71045 X-RAY EXAM CHEST 1 VIEW: CPT

## 2024-06-09 PROCEDURE — 96375 TX/PRO/DX INJ NEW DRUG ADDON: CPT

## 2024-06-09 PROCEDURE — 25010000002 FUROSEMIDE PER 20 MG: Performed by: INTERNAL MEDICINE

## 2024-06-09 PROCEDURE — 99223 1ST HOSP IP/OBS HIGH 75: CPT | Performed by: INTERNAL MEDICINE

## 2024-06-09 PROCEDURE — G0378 HOSPITAL OBSERVATION PER HR: HCPCS

## 2024-06-09 PROCEDURE — 93005 ELECTROCARDIOGRAM TRACING: CPT | Performed by: INTERNAL MEDICINE

## 2024-06-09 PROCEDURE — 85025 COMPLETE CBC W/AUTO DIFF WBC: CPT | Performed by: EMERGENCY MEDICINE

## 2024-06-09 PROCEDURE — 94726 PLETHYSMOGRAPHY LUNG VOLUMES: CPT

## 2024-06-09 PROCEDURE — 80053 COMPREHEN METABOLIC PANEL: CPT | Performed by: EMERGENCY MEDICINE

## 2024-06-09 PROCEDURE — 84484 ASSAY OF TROPONIN QUANT: CPT | Performed by: EMERGENCY MEDICINE

## 2024-06-09 PROCEDURE — 82948 REAGENT STRIP/BLOOD GLUCOSE: CPT

## 2024-06-09 PROCEDURE — 71045 X-RAY EXAM CHEST 1 VIEW: CPT | Performed by: RADIOLOGY

## 2024-06-09 PROCEDURE — 83880 ASSAY OF NATRIURETIC PEPTIDE: CPT

## 2024-06-09 RX ORDER — ATORVASTATIN CALCIUM 40 MG/1
80 TABLET, FILM COATED ORAL NIGHTLY
Status: DISCONTINUED | OUTPATIENT
Start: 2024-06-10 | End: 2024-06-11 | Stop reason: HOSPADM

## 2024-06-09 RX ORDER — CLONAZEPAM 0.5 MG/1
0.5 TABLET ORAL 2 TIMES DAILY PRN
Status: DISCONTINUED | OUTPATIENT
Start: 2024-06-09 | End: 2024-06-11 | Stop reason: HOSPADM

## 2024-06-09 RX ORDER — DOFETILIDE 0.25 MG/1
500 CAPSULE ORAL EVERY 12 HOURS SCHEDULED
Status: CANCELLED | OUTPATIENT
Start: 2024-06-09

## 2024-06-09 RX ORDER — GLUCAGON 1 MG/ML
1 KIT INJECTION
Status: DISCONTINUED | OUTPATIENT
Start: 2024-06-09 | End: 2024-06-11 | Stop reason: HOSPADM

## 2024-06-09 RX ORDER — ACETAMINOPHEN 325 MG/1
650 TABLET ORAL DAILY PRN
Status: DISCONTINUED | OUTPATIENT
Start: 2024-06-09 | End: 2024-06-11 | Stop reason: HOSPADM

## 2024-06-09 RX ORDER — ASPIRIN 81 MG/1
81 TABLET ORAL EVERY MORNING
Status: CANCELLED | OUTPATIENT
Start: 2024-06-10

## 2024-06-09 RX ORDER — TIZANIDINE 4 MG/1
4 TABLET ORAL EVERY 8 HOURS PRN
Status: CANCELLED | OUTPATIENT
Start: 2024-06-09

## 2024-06-09 RX ORDER — ERGOCALCIFEROL 1.25 MG/1
50000 CAPSULE ORAL WEEKLY
COMMUNITY

## 2024-06-09 RX ORDER — MULTIPLE VITAMINS W/ MINERALS TAB 9MG-400MCG
1 TAB ORAL EVERY MORNING
Status: DISCONTINUED | OUTPATIENT
Start: 2024-06-10 | End: 2024-06-11 | Stop reason: HOSPADM

## 2024-06-09 RX ORDER — ASPIRIN 81 MG/1
81 TABLET ORAL EVERY MORNING
COMMUNITY

## 2024-06-09 RX ORDER — ASPIRIN 81 MG/1
324 TABLET, CHEWABLE ORAL ONCE
Status: COMPLETED | OUTPATIENT
Start: 2024-06-09 | End: 2024-06-09

## 2024-06-09 RX ORDER — TIZANIDINE 4 MG/1
4 TABLET ORAL EVERY 8 HOURS PRN
Status: DISCONTINUED | OUTPATIENT
Start: 2024-06-09 | End: 2024-06-11 | Stop reason: HOSPADM

## 2024-06-09 RX ORDER — OXYCODONE AND ACETAMINOPHEN 10; 325 MG/1; MG/1
1 TABLET ORAL EVERY 8 HOURS PRN
Status: DISCONTINUED | OUTPATIENT
Start: 2024-06-09 | End: 2024-06-11 | Stop reason: HOSPADM

## 2024-06-09 RX ORDER — PANTOPRAZOLE SODIUM 40 MG/1
40 TABLET, DELAYED RELEASE ORAL NIGHTLY
Status: CANCELLED | OUTPATIENT
Start: 2024-06-09

## 2024-06-09 RX ORDER — FAMOTIDINE 20 MG/1
20 TABLET, FILM COATED ORAL 2 TIMES DAILY
Status: DISCONTINUED | OUTPATIENT
Start: 2024-06-10 | End: 2024-06-11 | Stop reason: HOSPADM

## 2024-06-09 RX ORDER — DEXTROSE MONOHYDRATE 25 G/50ML
25 INJECTION, SOLUTION INTRAVENOUS
Status: DISCONTINUED | OUTPATIENT
Start: 2024-06-09 | End: 2024-06-11 | Stop reason: HOSPADM

## 2024-06-09 RX ORDER — PANTOPRAZOLE SODIUM 40 MG/1
40 TABLET, DELAYED RELEASE ORAL
Status: DISCONTINUED | OUTPATIENT
Start: 2024-06-10 | End: 2024-06-11 | Stop reason: HOSPADM

## 2024-06-09 RX ORDER — ALLOPURINOL 300 MG/1
300 TABLET ORAL DAILY
Status: DISCONTINUED | OUTPATIENT
Start: 2024-06-10 | End: 2024-06-11 | Stop reason: HOSPADM

## 2024-06-09 RX ORDER — MULTIPLE VITAMINS W/ MINERALS TAB 9MG-400MCG
1 TAB ORAL EVERY MORNING
COMMUNITY

## 2024-06-09 RX ORDER — OXYCODONE AND ACETAMINOPHEN 10; 325 MG/1; MG/1
1 TABLET ORAL EVERY 8 HOURS
Status: CANCELLED | OUTPATIENT
Start: 2024-06-09

## 2024-06-09 RX ORDER — AMOXICILLIN 250 MG
2 CAPSULE ORAL 2 TIMES DAILY PRN
Status: DISCONTINUED | OUTPATIENT
Start: 2024-06-09 | End: 2024-06-11 | Stop reason: HOSPADM

## 2024-06-09 RX ORDER — OXYCODONE AND ACETAMINOPHEN 10; 325 MG/1; MG/1
1 TABLET ORAL ONCE
Status: COMPLETED | OUTPATIENT
Start: 2024-06-09 | End: 2024-06-09

## 2024-06-09 RX ORDER — BISACODYL 10 MG
10 SUPPOSITORY, RECTAL RECTAL DAILY PRN
Status: DISCONTINUED | OUTPATIENT
Start: 2024-06-09 | End: 2024-06-11 | Stop reason: HOSPADM

## 2024-06-09 RX ORDER — SODIUM CHLORIDE 0.9 % (FLUSH) 0.9 %
10 SYRINGE (ML) INJECTION AS NEEDED
Status: DISCONTINUED | OUTPATIENT
Start: 2024-06-09 | End: 2024-06-11 | Stop reason: HOSPADM

## 2024-06-09 RX ORDER — ALLOPURINOL 300 MG/1
300 TABLET ORAL DAILY
Status: CANCELLED | OUTPATIENT
Start: 2024-06-10

## 2024-06-09 RX ORDER — SODIUM CHLORIDE 0.9 % (FLUSH) 0.9 %
10 SYRINGE (ML) INJECTION EVERY 12 HOURS SCHEDULED
Status: DISCONTINUED | OUTPATIENT
Start: 2024-06-09 | End: 2024-06-11 | Stop reason: HOSPADM

## 2024-06-09 RX ORDER — GLIPIZIDE 5 MG/1
10 TABLET ORAL DAILY
Status: CANCELLED | OUTPATIENT
Start: 2024-06-10

## 2024-06-09 RX ORDER — BISACODYL 5 MG/1
5 TABLET, DELAYED RELEASE ORAL DAILY PRN
Status: DISCONTINUED | OUTPATIENT
Start: 2024-06-09 | End: 2024-06-11 | Stop reason: HOSPADM

## 2024-06-09 RX ORDER — FAMOTIDINE 20 MG/1
20 TABLET, FILM COATED ORAL 2 TIMES DAILY
Status: CANCELLED | OUTPATIENT
Start: 2024-06-09

## 2024-06-09 RX ORDER — NICOTINE POLACRILEX 4 MG
15 LOZENGE BUCCAL
Status: DISCONTINUED | OUTPATIENT
Start: 2024-06-09 | End: 2024-06-11 | Stop reason: HOSPADM

## 2024-06-09 RX ORDER — SEMAGLUTIDE 2.68 MG/ML
2 INJECTION, SOLUTION SUBCUTANEOUS WEEKLY
COMMUNITY

## 2024-06-09 RX ORDER — POLYETHYLENE GLYCOL 3350 17 G/17G
17 POWDER, FOR SOLUTION ORAL DAILY PRN
Status: DISCONTINUED | OUTPATIENT
Start: 2024-06-09 | End: 2024-06-11 | Stop reason: HOSPADM

## 2024-06-09 RX ORDER — MULTIPLE VITAMINS W/ MINERALS TAB 9MG-400MCG
1 TAB ORAL EVERY MORNING
Status: CANCELLED | OUTPATIENT
Start: 2024-06-10

## 2024-06-09 RX ORDER — ACETAMINOPHEN 325 MG/1
650 TABLET ORAL DAILY PRN
Status: CANCELLED | OUTPATIENT
Start: 2024-06-10

## 2024-06-09 RX ORDER — DOFETILIDE 0.25 MG/1
500 CAPSULE ORAL EVERY 12 HOURS SCHEDULED
Status: DISCONTINUED | OUTPATIENT
Start: 2024-06-10 | End: 2024-06-11 | Stop reason: HOSPADM

## 2024-06-09 RX ORDER — SENNOSIDES 8.6 MG
650 CAPSULE ORAL DAILY PRN
COMMUNITY

## 2024-06-09 RX ORDER — CLONAZEPAM 0.5 MG/1
0.5 TABLET ORAL 2 TIMES DAILY PRN
Status: CANCELLED | OUTPATIENT
Start: 2024-06-09

## 2024-06-09 RX ORDER — SODIUM CHLORIDE 9 MG/ML
40 INJECTION, SOLUTION INTRAVENOUS AS NEEDED
Status: DISCONTINUED | OUTPATIENT
Start: 2024-06-09 | End: 2024-06-11 | Stop reason: HOSPADM

## 2024-06-09 RX ORDER — INSULIN LISPRO 100 [IU]/ML
2-7 INJECTION, SOLUTION INTRAVENOUS; SUBCUTANEOUS
Status: DISCONTINUED | OUTPATIENT
Start: 2024-06-09 | End: 2024-06-11 | Stop reason: HOSPADM

## 2024-06-09 RX ORDER — GABAPENTIN 400 MG/1
800 CAPSULE ORAL EVERY 8 HOURS SCHEDULED
Status: CANCELLED | OUTPATIENT
Start: 2024-06-09

## 2024-06-09 RX ORDER — ATORVASTATIN CALCIUM 40 MG/1
80 TABLET, FILM COATED ORAL NIGHTLY
Status: CANCELLED | OUTPATIENT
Start: 2024-06-09

## 2024-06-09 RX ORDER — GABAPENTIN 400 MG/1
800 CAPSULE ORAL EVERY 8 HOURS SCHEDULED
Status: DISCONTINUED | OUTPATIENT
Start: 2024-06-10 | End: 2024-06-11 | Stop reason: HOSPADM

## 2024-06-09 RX ORDER — ASPIRIN 81 MG/1
81 TABLET ORAL DAILY
Status: DISCONTINUED | OUTPATIENT
Start: 2024-06-10 | End: 2024-06-11 | Stop reason: HOSPADM

## 2024-06-09 RX ORDER — FUROSEMIDE 10 MG/ML
20 INJECTION INTRAMUSCULAR; INTRAVENOUS ONCE
Status: COMPLETED | OUTPATIENT
Start: 2024-06-09 | End: 2024-06-09

## 2024-06-09 RX ADMIN — ASPIRIN 243 MG: 81 TABLET, CHEWABLE ORAL at 13:00

## 2024-06-09 RX ADMIN — METOPROLOL TARTRATE 25 MG: 25 TABLET, FILM COATED ORAL at 23:04

## 2024-06-09 RX ADMIN — GABAPENTIN 800 MG: 400 CAPSULE ORAL at 23:40

## 2024-06-09 RX ADMIN — MORPHINE SULFATE 4 MG: 4 INJECTION, SOLUTION INTRAMUSCULAR; INTRAVENOUS at 14:49

## 2024-06-09 RX ADMIN — TIZANIDINE 4 MG: 4 TABLET ORAL at 23:40

## 2024-06-09 RX ADMIN — METOPROLOL TARTRATE 25 MG: 25 TABLET, FILM COATED ORAL at 13:42

## 2024-06-09 RX ADMIN — OXYCODONE HYDROCHLORIDE AND ACETAMINOPHEN 1 TABLET: 10; 325 TABLET ORAL at 13:00

## 2024-06-09 RX ADMIN — APIXABAN 5 MG: 5 TABLET, FILM COATED ORAL at 23:04

## 2024-06-09 RX ADMIN — FUROSEMIDE 20 MG: 10 INJECTION, SOLUTION INTRAMUSCULAR; INTRAVENOUS at 18:44

## 2024-06-09 RX ADMIN — CLONAZEPAM 0.5 MG: 0.5 TABLET ORAL at 23:40

## 2024-06-09 NOTE — H&P
[facility] HOSPITALIST HISTORY AND PHYSICAL    Patient Identification:  Name:  Madelyn Velazquez  Age:  48 y.o.  Sex:  female  :  1975  MRN:  [id number]   Admit Date: 2024   Visit Number:  [id number]  Room number:  113/13  Primary Care Physician:  Mariusz Bailey PA     Subjective     Chief complaint:    Chief Complaint   Patient presents with    Chest Pain    Irregular Heart Beat     History of presenting illness:   48F Morbid Obesity by BMI PMH Gout, GERD, Diabetes Mellitus Type II, Hypertension, Paroxysmal Atrial Fibrillation on chronic anticoagulation, History Cerebrovascular Accident, presented to [facility] emergency room  with complaints of chest discomfort after converting into Atrial Fibrillation at home, worse with exertion.  Upon arrival patient afebrile, heart rate up to 109, respiratory rate 16, blood pressure 113/58, satting 98% on room air.  Labs showed WBC 9K, hemoglobin 11.1, creatinine 1.11, HS Troponins 15->14, proBNP 4000 (last 5000).  EKG showed Atrial Fibrillation with Rapid Ventricular Rate, ST/T wave abnormality inferolateral leads.  CXR showed no acute cardiopulmonary processes. Emergency room provider gave Aspirin 324, oral beta blocker, IV morphine.  Hospital Medicine consulted for admission.  Patient seen and examined in emergency room,  at bedside, patient reportedly went to Christianity today and began having increased shortness of breath and pressure, notices when she is in Atrial Fibrillation and could tell she is converted, denies nausea, vomiting, diaphoresis, notes lower extremity swelling is chronic, lays with head propped up in bed which is also chronic, reports hasn't seen her heart doctor in several years, takes antiarrhythmic agent and had been considered for ablation in past but has not had, reports last stress test was several years ago, has never had heart cath, currently in emergency room heart rate labile between 80-90's and  110-120's.    ---------------------------------------------------------------------------------------------------------------------   Review of Systems   Constitutional:  Positive for fatigue.   HENT: Negative.     Eyes: Negative.    Respiratory:  Positive for chest tightness and shortness of breath.    Cardiovascular:  Positive for palpitations and leg swelling. Negative for chest pain.   Gastrointestinal: Negative.    Endocrine: Negative.    Genitourinary: Negative.    Musculoskeletal: Negative.    Skin: Negative.    Allergic/Immunologic: Negative.    Neurological:  Positive for weakness.   Hematological: Negative.    Psychiatric/Behavioral: Negative.       ---------------------------------------------------------------------------------------------------------------------   Past Medical History:   Diagnosis Date    A-fib     Arrhythmia     ATRIAL FIB    Back pain     CTS (carpal tunnel syndrome)     Diabetes mellitus     Elevated cholesterol     GERD (gastroesophageal reflux disease)     Gout     Hypertension     Migraine     Osteoarthritis     Stroke     DEC 30 2018     Past Surgical History:   Procedure Laterality Date    CARPAL TUNNEL RELEASE Left     CHOLECYSTECTOMY      COLONOSCOPY      COLONOSCOPY N/A 11/15/2022    Procedure: COLONOSCOPY;  Surgeon: Kim Estes MD;  Location: [facility] OR;  Service: Gastroenterology;  Laterality: N/A;    ENDOSCOPY      ENDOSCOPY N/A 11/15/2022    Procedure: ESOPHAGOGASTRODUODENOSCOPY;  Surgeon: Kim Estes MD;  Location: [facility] OR;  Service: Gastroenterology;  Laterality: N/A;    HAND SURGERY Left     LEG SURGERY Left     OVARIAN CYST SURGERY       Family History   Problem Relation Age of Onset    Osteoarthritis Mother     Heart disease Mother     Hypertension Mother     Osteoarthritis Father     Hypertension Father     Osteoarthritis Sister     Rheum arthritis Sister     Heart disease Sister     Hypertension Sister     Migraines Sister      Osteoarthritis Brother     Heart disease Brother     Hypertension Brother     Osteoarthritis Maternal Grandmother     Heart disease Maternal Grandmother     Hypertension Maternal Grandmother     Osteoarthritis Maternal Grandfather     Heart disease Maternal Grandfather     Hypertension Maternal Grandfather     Breast cancer Neg Hx      Social History     Socioeconomic History    Marital status:    Tobacco Use    Smoking status: Never    Smokeless tobacco: Never   Vaping Use    Vaping status: Never Used   Substance and Sexual Activity    Alcohol use: No    Drug use: No    Sexual activity: Defer     ---------------------------------------------------------------------------------------------------------------------   Allergies:  Colchicine, Ultram [tramadol hcl], Cefdinir, Doxycycline, Indomethacin, Latex, Metformin, and Tape  ---------------------------------------------------------------------------------------------------------------------   Medications below are reported home medications pulling from within the system; at this time, these medications have not been reconciled unless otherwise specified and are in the verification process for further verifcation as current home medications.    Prior to Admission Medications       Prescriptions Last Dose Informant Patient Reported? Taking?    allopurinol (ZYLOPRIM) 300 MG tablet   Yes No    Take 1 tablet by mouth Daily.    apixaban (ELIQUIS) 5 MG tablet tablet   No No    Take 1 tablet by mouth Every 12 (Twelve) Hours.    aspirin 81 MG chewable tablet   No No    Chew 1 tablet Daily.    atorvastatin (LIPITOR) 80 MG tablet   No No    Take 1 tablet by mouth Every Night.    cholecalciferol (VITAMIN D3) 400 units tablet   Yes No    Take 1 tablet by mouth Daily.    clonazePAM (KlonoPIN) 0.5 MG tablet   Yes No    dexlansoprazole (Dexilant) 60 MG capsule   No No    Take 1 capsule by mouth Daily.    Diclofenac Sodium (VOLTAREN) 1 % gel gel   Yes No    dofetilide  (TIKOSYN) 500 MCG capsule   No No    Take 1 capsule by mouth Every 12 (Twelve) Hours.    famotidine (PEPCID) 20 MG tablet   Yes No    gabapentin (NEURONTIN) 800 MG tablet   Yes No    Take 1 tablet by mouth 3 (Three) Times a Day.    glipizide (GLUCOTROL) 10 MG tablet   Yes No    HYDROcodone-acetaminophen (NORCO) 7.5-325 MG per tablet   No No    Take 1 tablet by mouth Every 6 (Six) Hours As Needed for Moderate Pain.    hydrOXYzine (ATARAX) 25 MG tablet   Yes No    Loratadine 10 MG capsule   Yes No    Take  by mouth.    metoprolol tartrate (LOPRESSOR) 25 MG tablet   Yes No    Take 1 tablet by mouth 2 (Two) Times a Day.    multivitamin with minerals tablet tablet   Yes No    Take 1 tablet by mouth Daily.    omeprazole (priLOSEC) 40 MG capsule   Yes No    oxyCODONE-acetaminophen (PERCOCET)  MG per tablet   Yes No    Take 1 tablet by mouth Every 8 (Eight) Hours.    Semaglutide, 1 MG/DOSE, (Ozempic, 1 MG/DOSE,) 2 MG/1.5ML solution pen-injector   Yes No    Inject  under the skin into the appropriate area as directed 1 (One) Time Per Week.    SITagliptin (JANUVIA) 100 MG tablet  Self Yes No    Take 1 tablet by mouth Daily.    tiZANidine (ZANAFLEX) 2 MG tablet   Yes No    Take 1 tablet by mouth Every 8 (Eight) Hours.          Objective     Vital Signs:  Temp:  [98.4 °F (36.9 °C)] 98.4 °F (36.9 °C)  Heart Rate:  [] 99  Resp:  [16] 16  BP: (108-134)/() 128/100    Mean Arterial Pressure (Non-Invasive) for the past 24 hrs (Last 3 readings):   Noninvasive MAP (mmHg)   06/09/24 1540 107   06/09/24 1520 93   06/09/24 1500 100     SpO2:  [98 %-100 %] 98 %  on   ;   Device (Oxygen Therapy): room air  Body mass index is 44.11 kg/m².    Wt Readings from Last 3 Encounters:   06/09/24 117 kg (257 lb)   08/27/23 114 kg (251 lb)   06/07/23 126 kg (277 lb 12.5 oz)      ---------------------------------------------------------------------------------------------------------------------   Physical Exam:  Constitutional:   Well-developed and well-nourished.  Mild acute distress.      HENT:  Head:  Normocephalic and atraumatic.  Mouth:  Moist mucous membranes.    Eyes:  Conjunctivae and EOM are normal. No scleral icterus.    Neck:  Neck supple.  No JVD present.    Cardiovascular:  Tachycardic rate, irregular rhythm and normal heart sounds with no murmur.  Pulmonary/Chest:  No respiratory distress, no wheezes, no crackles, with normal breath sounds and good air movement.  Abdominal:  Soft. No distension and no tenderness.   Musculoskeletal:  No tenderness, and no deformity.  No red or swollen joints anywhere.    Neurological:  Alert and oriented to person, place, and time.  No cranial nerve deficit.    Skin:  Skin is warm and dry. No rash noted. No pallor.   Peripheral vascular:  No clubbing, no cyanosis, trace edema.  Psychiatric: Appropriate mood and affect  Edited by: Ken Erickson MD at 6/9/2024 1637  ---------------------------------------------------------------------------------------------------------------------  EKG:    ECG 12 Lead ED Triage Standing Order; Chest Pain   Final Result   Test Reason : ED Triage Standing Order~   Blood Pressure :   */*   mmHG   Vent. Rate :  99 BPM     Atrial Rate : 113 BPM      P-R Int :   * ms          QRS Dur :  80 ms       QT Int : 396 ms       P-R-T Axes :   *  28 189 degrees      QTc Int : 508 ms      Atrial fibrillation with premature ventricular or aberrantly conducted    complexes   ST & T wave abnormality, consider inferolateral ischemia   Abnormal ECG   When compared with ECG of 31-JAN-2020 04:39,   Atrial fibrillation has replaced Sinus rhythm   Vent. rate has increased BY  39 BPM   QT has lengthened   Confirmed by Juana Rae ([id number]) on 6/9/2024 1:00:56 PM      Referred By: INGRID           Confirmed By: Juana Rae        Telemetry:  labile but irregular, tachycardia and controlled at times.     I have personally looked at both the EKG and the telemetry strips.    Last  "echocardiogram:  Results for orders placed during the hospital encounter of 01/30/19    Adult Transesophageal Echo (RONNI) W/ Cont if Necessary Per Protocol    Interpretation Summary  · Left atrial cavity size is mildly dilated.  · Mild mitral valve regurgitation is present  · No atrial chamber clot identified    --------------------------------------------------------------------------------------------------------------------  Labs:  Results from last 7 days   Lab Units 06/09/24  1257   WBC 10*3/mm3 9.16   HEMOGLOBIN g/dL 11.1*   HEMATOCRIT % 37.5   MCV fL 89.5   MCHC g/dL 29.6*   PLATELETS 10*3/mm3 262         Results from last 7 days   Lab Units 06/09/24  1257   SODIUM mmol/L 136   POTASSIUM mmol/L 4.2   CHLORIDE mmol/L 104   CO2 mmol/L 23.1   BUN mg/dL 22*   CREATININE mg/dL 1.11*   CALCIUM mg/dL 9.2   GLUCOSE mg/dL 157*   ALBUMIN g/dL 3.7   BILIRUBIN mg/dL 0.2   ALK PHOS U/L 86   AST (SGOT) U/L 18   ALT (SGPT) U/L 20   Estimated Creatinine Clearance: 77.9 mL/min (A) (by C-G formula based on SCr of 1.11 mg/dL (H)).  No results found for: \"AMMONIA\"  Results from last 7 days   Lab Units 06/09/24  1505 06/09/24  1257   HSTROP T ng/L 14* 15*   PROBNP pg/mL  --  4,049.0*         No results found for: \"HGBA1C\", \"POCGLU\"  Lab Results   Component Value Date    TSH 1.310 05/02/2019     Lab Results   Component Value Date    PREGTESTUR Negative 04/02/2015     Pain Management Panel           No data to display              Brief Urine Lab Results       None          No results found for: \"BLOODCX\"  No results found for: \"URINECX\"  No results found for: \"WOUNDCX\"  No results found for: \"STOOLCX\"    I have personally looked at the labs and they are summarized above.  ----------------------------------------------------------------------------------------------------------------------  Detailed radiology reports for the last 24 hours:    Imaging Results (Last 24 Hours)       Procedure Component Value Units Date/Time    XR Chest " 1 View [[id number]] Collected: 06/09/24 1227     Updated: 06/09/24 1232    Narrative:      XR CHEST 1 VW-     CLINICAL INDICATION: Chest Pain Triage Protocol        COMPARISON: 5/2/2019     TECHNIQUE: Single frontal view of the chest.     FINDINGS:     LUNGS: Lungs are adequately aerated.      HEART AND MEDIASTINUM: Heart and mediastinal contours are unremarkable        SKELETON: Bony and soft tissue structures are unremarkable.             Impression:      No radiographic evidence of acute cardiac or pulmonary disease.           This report was finalized on 6/9/2024 12:27 PM by Dr. Lonnie Garcia MD.             I have personally looked at the radiology images and read the final radiology report.    Assessment & Plan    48F Morbid Obesity by BMI PMH Gout, GERD, Diabetes Mellitus Type II, Hypertension, Paroxysmal Atrial Fibrillation on chronic anticoagulation, History Cerebrovascular Accident, presented to [facility] emergency room 6/9 with complaints of chest discomfort after converting into Atrial Fibrillation at home, worse with exertion.    #Atrial Fibrillation with Rapid Ventricular Rate, on chronic anticoagulation   #Hypertension, now with Elevated HS Troponins and chest discomfort, suspect NSTEMI Type II due to tachycardia   - Labs showed HS Troponins 15->14, proBNP 4000 (last 5000)  - EKG showed Atrial Fibrillation with Rapid Ventricular Rate, ST/T wave abnormality inferolateral leads  - Cardiology consulted; Recommendations pending  - Continue home oral beta blocker and oral Eliquis  - Review remainder of home medications pending medication reconciliation  - Formal echocardiogram ordered and pending, follow up Cardiology recommendations on indications for inpatient stress testing.   - Hold on lasix, unless restarting home medication, check RedsVest   - Continue to monitor on telemetry, strict I/O's, trend heart rate and blood pressure     #Mild Renal Insufficiency on Chronic Kidney Disease stage  II  - Baseline creatinine 0.8-1.0, was 1.11 on admission   - Trend creatinine and urine output, avoid nephrotoxins, NSAIDs, dehydration and contrast as able.    #Non-Insulin Dependent Diabetes Mellitus Type II, uncontrolled/unknown control, unknown complications  - Holding home oral agents, continue FSBG and SSI, add long acting as indicated.    #History Cerebrovascular Accident   - Continue home medications pending medication reconciliation, supportive Care    #Gastroesophageal Reflux Disease  - Continue home PPI pending medication reconciliation    #Gout without Acute Flair  - Continue home Allopurinol pending medication reconciliation     #Morbid Obesity by BMI  - Body mass index is 44.11 kg/m².; complicates all aspects of care    F: Oral  E: Monitor & Replace as needed   N: Diet: Cardiac; Healthy Heart (2-3 Na+); Fluid Consistency: Thin (IDDSI 0)  NPO Diet NPO Type: Sips with Meds  PPx: Eliquis  Code Status (Patient has no pulse and is not breathing): CPR  Medical Interventions (Patient has pulse or is breathing): Full Support     Dispo: Pending workup and clinical improvement    *This patient is considered high risk secondary to Atrial Fibrillation with Rapid Ventricular Rate, chest discomfort/palpitations, elevated HS Troponins requiring further cardiac workup with imaging.     Edited by: Ken Erickson MD at 6/9/2024 9812    Ken Erickson MD  [facility]  06/09/24  16:42 EDT

## 2024-06-09 NOTE — CASE MANAGEMENT/SOCIAL WORK
Discharge Planning Assessment   [place]     Patient Name: Madelyn Velazquez  MRN: [id number]  Today's Date: 6/9/2024    Admit Date: 6/9/2024    Plan: Pt lives at home with spouse Mejia who is at bedside and plans to return home at discharge family will provide transportation. Pcp is Anthony Bailey, she uses [facility] and has [place] [employer] and [place] Medicaid. Pt is independent with adls' and does not use any dme, home health or home o2.   Discharge Needs Assessment       Row Name 06/09/24 1901       Living Environment    People in Home spouse    Current Living Arrangements home    Potentially Unsafe Housing Conditions none    In the past 12 months has the electric, gas, oil, or water company threatened to shut off services in your home? No    Primary Care Provided by self    Family Caregiver if Needed spouse    Quality of Family Relationships helpful;involved;supportive    Able to Return to Prior Arrangements yes       Resource/Environmental Concerns    Resource/Environmental Concerns none       Transition Planning    Patient/Family Anticipates Transition to home with family    Patient/Family Anticipated Services at Transition none    Transportation Anticipated family or friend will provide       Discharge Needs Assessment    Readmission Within the Last 30 Days no previous admission in last 30 days    Equipment Currently Used at Home none    Concerns to be Addressed no discharge needs identified;denies needs/concerns at this time    Anticipated Changes Related to Illness none    Equipment Needed After Discharge none                   Discharge Plan       Row Name 06/09/24 1902       Plan    Plan Pt lives at home with spouse Mejia who is at bedside and plans to return home at discharge family will provide transportation. Pcp is Anthony Bailey, she uses [facility] and has [place] [employer] [place] and [place] Medicaid. Pt is independent with adls' and does not use any dme, home health or  home o2.    Patient/Family in Agreement with Plan yes                  Continued Care and Services - Admitted Since 6/9/2024    No active coordination exists for this encounter.       Expected Discharge Date and Time       Expected Discharge Date Expected Discharge Time    Jun 11, 2024            Demographic Summary       Row Name 06/09/24 1901       General Information    Admission Type observation    Arrived From home    Referral Source emergency department    Reason for Consult discharge planning                  Meghan Bhandari, RN

## 2024-06-09 NOTE — ED PROVIDER NOTES
Subjective   History of Present Illness  Patient is a 48-year-old female with a history atrial fibrillation, diabetes, hyperlipidemia, hypertension, stroke, migraines, and GERD.  Patient reports she woke up this morning and was getting ready for [employer] when she went into A-fib.  Patient reports she feels a chest tightness, short of breath, and fatigue.  Patient denies abdominal pain, nausea, vomiting, or any fever.  Patient is alert and oriented and able to answer all questions appropriately.  Patient presents provide vehicle with  at bedside.        Review of Systems   Constitutional: Negative.  Positive for fatigue. Negative for fever.   HENT: Negative.     Respiratory: Negative.  Positive for chest tightness and shortness of breath.    Cardiovascular: Negative.  Negative for chest pain.   Gastrointestinal: Negative.  Negative for abdominal pain.   Endocrine: Negative.    Genitourinary: Negative.  Negative for dysuria.   Skin: Negative.    Neurological: Negative.    Psychiatric/Behavioral: Negative.     All other systems reviewed and are negative.      Past Medical History:   Diagnosis Date    A-fib     Arrhythmia     ATRIAL FIB    Back pain     CTS (carpal tunnel syndrome)     Diabetes mellitus     Elevated cholesterol     GERD (gastroesophageal reflux disease)     Gout     Hypertension     Migraine     Osteoarthritis     Stroke     DEC 30 2018       Allergies   Allergen Reactions    Colchicine Rash    Ultram [Tramadol Hcl] Nausea And Vomiting    Cefdinir Rash    Doxycycline Rash    Indomethacin Rash    Latex Hives    Metformin Rash    Tape Hives       Past Surgical History:   Procedure Laterality Date    CARPAL TUNNEL RELEASE Left     CHOLECYSTECTOMY      COLONOSCOPY      COLONOSCOPY N/A 11/15/2022    Procedure: COLONOSCOPY;  Surgeon: Kim Estes MD;  Location: [facility];  Service: Gastroenterology;  Laterality: N/A;    ENDOSCOPY      ENDOSCOPY N/A 11/15/2022    Procedure:  ESOPHAGOGASTRODUODENOSCOPY;  Surgeon: Kim Estes MD;  Location: [facility] OR;  Service: Gastroenterology;  Laterality: N/A;    HAND SURGERY Left     LEG SURGERY Left     OVARIAN CYST SURGERY         Family History   Problem Relation Age of Onset    Osteoarthritis Mother     Heart disease Mother     Hypertension Mother     Osteoarthritis Father     Hypertension Father     Osteoarthritis Sister     Rheum arthritis Sister     Heart disease Sister     Hypertension Sister     Migraines Sister     Osteoarthritis Brother     Heart disease Brother     Hypertension Brother     Osteoarthritis Maternal Grandmother     Heart disease Maternal Grandmother     Hypertension Maternal Grandmother     Osteoarthritis Maternal Grandfather     Heart disease Maternal Grandfather     Hypertension Maternal Grandfather     Breast cancer Neg Hx        Social History     Socioeconomic History    Marital status:    Tobacco Use    Smoking status: Never    Smokeless tobacco: Never   Vaping Use    Vaping status: Never Used   Substance and Sexual Activity    Alcohol use: No    Drug use: No    Sexual activity: Defer           Objective   Physical Exam  Vitals and nursing note reviewed.   Constitutional:       General: She is not in acute distress.     Appearance: She is well-developed. She is not diaphoretic.   HENT:      Head: Normocephalic and atraumatic.      Right Ear: External ear normal.      Left Ear: External ear normal.      Nose: Nose normal.   Eyes:      Conjunctiva/sclera: Conjunctivae normal.      Pupils: Pupils are equal, round, and reactive to light.   Neck:      Vascular: No JVD.      Trachea: No tracheal deviation.   Cardiovascular:      Rate and Rhythm: Normal rate. Rhythm irregular.      Heart sounds: Normal heart sounds. No murmur heard.     Comments: Atrial fibrillation  Pulmonary:      Effort: Pulmonary effort is normal. No respiratory distress.      Breath sounds: Normal breath sounds. No wheezing.    Abdominal:      General: Bowel sounds are normal.      Palpations: Abdomen is soft.      Tenderness: There is no abdominal tenderness.   Musculoskeletal:         General: No deformity. Normal range of motion.      Cervical back: Normal range of motion and neck supple.   Skin:     General: Skin is warm and dry.      Coloration: Skin is not pale.      Findings: No erythema or rash.   Neurological:      Mental Status: She is alert and oriented to person, place, and time.      Cranial Nerves: No cranial nerve deficit.   Psychiatric:         Behavior: Behavior normal.         Thought Content: Thought content normal.       Results for orders placed or performed during the hospital encounter of 06/09/24   Comprehensive Metabolic Panel    Specimen: Arm, Left; Blood   Result Value Ref Range    Glucose 157 (H) 65 - 99 mg/dL    BUN 22 (H) 6 - 20 mg/dL    Creatinine 1.11 (H) 0.57 - 1.00 mg/dL    Sodium 136 136 - 145 mmol/L    Potassium 4.2 3.5 - 5.2 mmol/L    Chloride 104 98 - 107 mmol/L    CO2 23.1 22.0 - 29.0 mmol/L    Calcium 9.2 8.6 - 10.5 mg/dL    Total Protein 6.8 6.0 - 8.5 g/dL    Albumin 3.7 3.5 - 5.2 g/dL    ALT (SGPT) 20 1 - 33 U/L    AST (SGOT) 18 1 - 32 U/L    Alkaline Phosphatase 86 39 - 117 U/L    Total Bilirubin 0.2 0.0 - 1.2 mg/dL    Globulin 3.1 gm/dL    A/G Ratio 1.2 g/dL    BUN/Creatinine Ratio 19.8 7.0 - 25.0    Anion Gap 8.9 5.0 - 15.0 mmol/L    eGFR 61.4 >60.0 mL/min/1.73   High Sensitivity Troponin T    Specimen: Arm, Left; Blood   Result Value Ref Range    HS Troponin T 15 (H) <14 ng/L   CBC Auto Differential    Specimen: Arm, Left; Blood   Result Value Ref Range    WBC 9.16 3.40 - 10.80 10*3/mm3    RBC 4.19 3.77 - 5.28 10*6/mm3    Hemoglobin 11.1 (L) 12.0 - 15.9 g/dL    Hematocrit 37.5 34.0 - 46.6 %    MCV 89.5 79.0 - 97.0 fL    MCH 26.5 (L) 26.6 - 33.0 pg    MCHC 29.6 (L) 31.5 - 35.7 g/dL    RDW 16.2 (H) 12.3 - 15.4 %    RDW-SD 53.1 37.0 - 54.0 fl    MPV 11.8 6.0 - 12.0 fL    Platelets 262 140 - 450  10*3/mm3    Neutrophil % 63.9 42.7 - 76.0 %    Lymphocyte % 25.8 19.6 - 45.3 %    Monocyte % 8.4 5.0 - 12.0 %    Eosinophil % 1.0 0.3 - 6.2 %    Basophil % 0.5 0.0 - 1.5 %    Immature Grans % 0.4 0.0 - 0.5 %    Neutrophils, Absolute 5.85 1.70 - 7.00 10*3/mm3    Lymphocytes, Absolute 2.36 0.70 - 3.10 10*3/mm3    Monocytes, Absolute 0.77 0.10 - 0.90 10*3/mm3    Eosinophils, Absolute 0.09 0.00 - 0.40 10*3/mm3    Basophils, Absolute 0.05 0.00 - 0.20 10*3/mm3    Immature Grans, Absolute 0.04 0.00 - 0.05 10*3/mm3    nRBC 0.0 0.0 - 0.2 /100 WBC   BNP    Specimen: Arm, Left; Blood   Result Value Ref Range    proBNP 4,049.0 (H) 0.0 - 450.0 pg/mL   High Sensitivity Troponin T 2Hr    Specimen: Arm, Left; Blood   Result Value Ref Range    HS Troponin T 14 (H) <14 ng/L    Troponin T Delta -1 >=-4 - <+4 ng/L   ECG 12 Lead ED Triage Standing Order; Chest Pain   Result Value Ref Range    QT Interval 396 ms    QTC Interval 508 ms   Green Top (Gel)   Result Value Ref Range    Extra Tube Hold for add-ons.    Lavender Top   Result Value Ref Range    Extra Tube hold for add-on    Gold Top - SST   Result Value Ref Range    Extra Tube Hold for add-ons.    Light Blue Top   Result Value Ref Range    Extra Tube Hold for add-ons.      XR Chest 1 View    Result Date: 6/9/2024  No radiographic evidence of acute cardiac or pulmonary disease.    This report was finalized on 6/9/2024 12:27 PM by Dr. Lonnie Garcia MD.         Procedures           ED Course  ED Course as of 06/09/24 1602   Sun Jun 09, 2024   1218 ECG 12 Lead ED Triage Standing Order; Chest Pain  Narrow complex, irregular rhythm, possible P waves without conducting QRS complex in V1 and V2, concern for possible atrial flutter, nonspecific ST changes, interpreted by myself.      Electronically signed by Chaim Montez MD, 06/09/24, 12:58 PM EDT.   [SM]   6058 Spoke with Dr. Rae at this time.  States to give 1 dose of p.o. metoprolol 25 mg.  Trend troponins if normal and no  chest pain patient may follow-up with outpatient stress test. [KH]   3047 Spoke with Dr. Erickson accepts patient to the hospitalist service. [KH]      ED Course User Index  [HARJIT] Miguelina Mora, TRUDI  [SM] Chaim Montez MD                HEART Score: 4                              Medical Decision Making  Patient is a 48-year-old female with a history atrial fibrillation, diabetes, hyperlipidemia, hypertension, stroke, migraines, and GERD.  Patient reports she woke up this morning and was getting ready for [employer] when she went into [employer].  Patient reports she feels a chest tightness, short of breath, and fatigue.  Patient denies abdominal pain, nausea, vomiting, or any fever.  Patient is alert and oriented and able to answer all questions appropriately.  Patient presents provide vehicle with  at bedside.    Amount and/or Complexity of Data Reviewed  Labs: ordered.  Radiology: ordered.  ECG/medicine tests: ordered. Decision-making details documented in ED Course.    Risk  OTC drugs.  Prescription drug management.        Final diagnoses:   Chest pressure   Atrial fibrillation, unspecified type       ED Disposition  ED Disposition       ED Disposition   Decision to Admit    Condition   --    Comment   Level of Care: Telemetry [5]   Diagnosis: Chest pressure [795338]   Admitting Physician: ALEX ERICKSON [[id number]]   Attending Physician: ALEX ERICKSON [[id number]]                 No follow-up provider specified.       Medication List      No changes were made to your prescriptions during this visit.            Miguelina Mora, TRUDI  06/09/24 8200

## 2024-06-10 ENCOUNTER — APPOINTMENT (OUTPATIENT)
Dept: NUCLEAR MEDICINE | Facility: HOSPITAL | Age: 49
End: 2024-06-10
Payer: MEDICARE

## 2024-06-10 ENCOUNTER — APPOINTMENT (OUTPATIENT)
Dept: CARDIOLOGY | Facility: HOSPITAL | Age: 49
End: 2024-06-10
Payer: MEDICARE

## 2024-06-10 ENCOUNTER — APPOINTMENT (OUTPATIENT)
Dept: CARDIOLOGY | Facility: HOSPITAL | Age: 49
End: 2024-06-10
Payer: COMMERCIAL

## 2024-06-10 LAB
ALBUMIN SERPL-MCNC: 3.8 G/DL (ref 3.5–5.2)
ALBUMIN/GLOB SERPL: 1.2 G/DL
ALP SERPL-CCNC: 84 U/L (ref 39–117)
ALT SERPL W P-5'-P-CCNC: 21 U/L (ref 1–33)
ANION GAP SERPL CALCULATED.3IONS-SCNC: 12.7 MMOL/L (ref 5–15)
AST SERPL-CCNC: 18 U/L (ref 1–32)
BILIRUB SERPL-MCNC: 0.2 MG/DL (ref 0–1.2)
BUN SERPL-MCNC: 19 MG/DL (ref 6–20)
BUN/CREAT SERPL: 17.6 (ref 7–25)
CALCIUM SPEC-SCNC: 9.6 MG/DL (ref 8.6–10.5)
CHLORIDE SERPL-SCNC: 103 MMOL/L (ref 98–107)
CO2 SERPL-SCNC: 25.3 MMOL/L (ref 22–29)
CREAT SERPL-MCNC: 1.08 MG/DL (ref 0.57–1)
DEPRECATED RDW RBC AUTO: 50.5 FL (ref 37–54)
EGFRCR SERPLBLD CKD-EPI 2021: 63.5 ML/MIN/1.73
ERYTHROCYTE [DISTWIDTH] IN BLOOD BY AUTOMATED COUNT: 16 % (ref 12.3–15.4)
GLOBULIN UR ELPH-MCNC: 3.1 GM/DL
GLUCOSE BLDC GLUCOMTR-MCNC: 119 MG/DL (ref 70–130)
GLUCOSE BLDC GLUCOMTR-MCNC: 124 MG/DL (ref 70–130)
GLUCOSE BLDC GLUCOMTR-MCNC: 151 MG/DL (ref 70–130)
GLUCOSE BLDC GLUCOMTR-MCNC: 154 MG/DL (ref 70–130)
GLUCOSE SERPL-MCNC: 130 MG/DL (ref 65–99)
HCT VFR BLD AUTO: 38 % (ref 34–46.6)
HGB BLD-MCNC: 11.5 G/DL (ref 12–15.9)
MCH RBC QN AUTO: 26.3 PG (ref 26.6–33)
MCHC RBC AUTO-ENTMCNC: 30.3 G/DL (ref 31.5–35.7)
MCV RBC AUTO: 86.8 FL (ref 79–97)
PLATELET # BLD AUTO: 274 10*3/MM3 (ref 140–450)
PMV BLD AUTO: 11.7 FL (ref 6–12)
POTASSIUM SERPL-SCNC: 4.2 MMOL/L (ref 3.5–5.2)
PROT SERPL-MCNC: 6.9 G/DL (ref 6–8.5)
QT INTERVAL: 366 MS
QTC INTERVAL: 477 MS
RBC # BLD AUTO: 4.38 10*6/MM3 (ref 3.77–5.28)
SODIUM SERPL-SCNC: 141 MMOL/L (ref 136–145)
TROPONIN T SERPL HS-MCNC: 12 NG/L
TROPONIN T SERPL HS-MCNC: 13 NG/L
WBC NRBC COR # BLD AUTO: 12.36 10*3/MM3 (ref 3.4–10.8)

## 2024-06-10 PROCEDURE — 84484 ASSAY OF TROPONIN QUANT: CPT | Performed by: INTERNAL MEDICINE

## 2024-06-10 PROCEDURE — 25010000002 DIGOXIN PER 500 MCG: Performed by: NURSE PRACTITIONER

## 2024-06-10 PROCEDURE — 93017 CV STRESS TEST TRACING ONLY: CPT

## 2024-06-10 PROCEDURE — A9500 TC99M SESTAMIBI: HCPCS | Performed by: INTERNAL MEDICINE

## 2024-06-10 PROCEDURE — G0378 HOSPITAL OBSERVATION PER HR: HCPCS

## 2024-06-10 PROCEDURE — 99233 SBSQ HOSP IP/OBS HIGH 50: CPT | Performed by: INTERNAL MEDICINE

## 2024-06-10 PROCEDURE — 93010 ELECTROCARDIOGRAM REPORT: CPT | Performed by: INTERNAL MEDICINE

## 2024-06-10 PROCEDURE — 85027 COMPLETE CBC AUTOMATED: CPT | Performed by: INTERNAL MEDICINE

## 2024-06-10 PROCEDURE — 78452 HT MUSCLE IMAGE SPECT MULT: CPT

## 2024-06-10 PROCEDURE — 93306 TTE W/DOPPLER COMPLETE: CPT

## 2024-06-10 PROCEDURE — 93018 CV STRESS TEST I&R ONLY: CPT | Performed by: INTERNAL MEDICINE

## 2024-06-10 PROCEDURE — 99203 OFFICE O/P NEW LOW 30 MIN: CPT | Performed by: NURSE PRACTITIONER

## 2024-06-10 PROCEDURE — 36415 COLL VENOUS BLD VENIPUNCTURE: CPT | Performed by: INTERNAL MEDICINE

## 2024-06-10 PROCEDURE — 25010000002 FUROSEMIDE PER 20 MG: Performed by: INTERNAL MEDICINE

## 2024-06-10 PROCEDURE — 0 TECHNETIUM SESTAMIBI: Performed by: INTERNAL MEDICINE

## 2024-06-10 PROCEDURE — 93306 TTE W/DOPPLER COMPLETE: CPT | Performed by: INTERNAL MEDICINE

## 2024-06-10 PROCEDURE — 93005 ELECTROCARDIOGRAM TRACING: CPT | Performed by: INTERNAL MEDICINE

## 2024-06-10 PROCEDURE — 78452 HT MUSCLE IMAGE SPECT MULT: CPT | Performed by: INTERNAL MEDICINE

## 2024-06-10 PROCEDURE — 80053 COMPREHEN METABOLIC PANEL: CPT | Performed by: INTERNAL MEDICINE

## 2024-06-10 PROCEDURE — 96375 TX/PRO/DX INJ NEW DRUG ADDON: CPT

## 2024-06-10 PROCEDURE — 82948 REAGENT STRIP/BLOOD GLUCOSE: CPT

## 2024-06-10 PROCEDURE — 96376 TX/PRO/DX INJ SAME DRUG ADON: CPT

## 2024-06-10 PROCEDURE — 63710000001 INSULIN LISPRO (HUMAN) PER 5 UNITS: Performed by: INTERNAL MEDICINE

## 2024-06-10 RX ORDER — METOPROLOL TARTRATE 50 MG/1
50 TABLET, FILM COATED ORAL EVERY 12 HOURS SCHEDULED
Status: DISCONTINUED | OUTPATIENT
Start: 2024-06-10 | End: 2024-06-11 | Stop reason: HOSPADM

## 2024-06-10 RX ORDER — FUROSEMIDE 10 MG/ML
20 INJECTION INTRAMUSCULAR; INTRAVENOUS ONCE
Status: COMPLETED | OUTPATIENT
Start: 2024-06-10 | End: 2024-06-10

## 2024-06-10 RX ORDER — DIGOXIN 0.25 MG/ML
250 INJECTION INTRAMUSCULAR; INTRAVENOUS EVERY 6 HOURS
Qty: 6 ML | Refills: 0 | Status: COMPLETED | OUTPATIENT
Start: 2024-06-10 | End: 2024-06-11

## 2024-06-10 RX ORDER — DIGOXIN 0.25 MG/ML
125 INJECTION INTRAMUSCULAR; INTRAVENOUS ONCE
Status: COMPLETED | OUTPATIENT
Start: 2024-06-10 | End: 2024-06-10

## 2024-06-10 RX ADMIN — TECHNETIUM TC 99M SESTAMIBI 1 DOSE: 1 INJECTION INTRAVENOUS at 13:00

## 2024-06-10 RX ADMIN — INSULIN LISPRO 2 UNITS: 100 INJECTION, SOLUTION INTRAVENOUS; SUBCUTANEOUS at 08:42

## 2024-06-10 RX ADMIN — METOPROLOL TARTRATE 25 MG: 25 TABLET, FILM COATED ORAL at 10:11

## 2024-06-10 RX ADMIN — OXYCODONE HYDROCHLORIDE AND ACETAMINOPHEN 1 TABLET: 10; 325 TABLET ORAL at 00:54

## 2024-06-10 RX ADMIN — ASPIRIN 81 MG: 81 TABLET, COATED ORAL at 08:43

## 2024-06-10 RX ADMIN — TIZANIDINE 4 MG: 4 TABLET ORAL at 20:53

## 2024-06-10 RX ADMIN — ALLOPURINOL 300 MG: 300 TABLET ORAL at 08:43

## 2024-06-10 RX ADMIN — GABAPENTIN 800 MG: 400 CAPSULE ORAL at 06:13

## 2024-06-10 RX ADMIN — DIGOXIN 250 MCG: 250 INJECTION, SOLUTION INTRAMUSCULAR; INTRAVENOUS; PARENTERAL at 17:41

## 2024-06-10 RX ADMIN — Medication 1 TABLET: at 06:13

## 2024-06-10 RX ADMIN — CLONAZEPAM 0.5 MG: 0.5 TABLET ORAL at 16:06

## 2024-06-10 RX ADMIN — ATORVASTATIN CALCIUM 80 MG: 40 TABLET, FILM COATED ORAL at 20:53

## 2024-06-10 RX ADMIN — FAMOTIDINE 20 MG: 20 TABLET, FILM COATED ORAL at 00:54

## 2024-06-10 RX ADMIN — Medication 10 ML: at 20:54

## 2024-06-10 RX ADMIN — DOFETILIDE 500 MCG: 0.25 CAPSULE ORAL at 20:53

## 2024-06-10 RX ADMIN — PANTOPRAZOLE SODIUM 40 MG: 40 TABLET, DELAYED RELEASE ORAL at 06:13

## 2024-06-10 RX ADMIN — OXYCODONE HYDROCHLORIDE AND ACETAMINOPHEN 1 TABLET: 10; 325 TABLET ORAL at 17:04

## 2024-06-10 RX ADMIN — Medication 10 ML: at 08:43

## 2024-06-10 RX ADMIN — FUROSEMIDE 20 MG: 10 INJECTION, SOLUTION INTRAMUSCULAR; INTRAVENOUS at 08:57

## 2024-06-10 RX ADMIN — DOFETILIDE 500 MCG: 0.25 CAPSULE ORAL at 00:54

## 2024-06-10 RX ADMIN — DOFETILIDE 500 MCG: 0.25 CAPSULE ORAL at 08:42

## 2024-06-10 RX ADMIN — FAMOTIDINE 20 MG: 20 TABLET, FILM COATED ORAL at 20:53

## 2024-06-10 RX ADMIN — DIGOXIN 125 MCG: 250 INJECTION, SOLUTION INTRAMUSCULAR; INTRAVENOUS; PARENTERAL at 15:48

## 2024-06-10 RX ADMIN — OXYCODONE HYDROCHLORIDE AND ACETAMINOPHEN 1 TABLET: 10; 325 TABLET ORAL at 08:56

## 2024-06-10 RX ADMIN — NITROGLYCERIN 1 INCH: 20 OINTMENT TOPICAL at 22:41

## 2024-06-10 RX ADMIN — GABAPENTIN 800 MG: 400 CAPSULE ORAL at 15:09

## 2024-06-10 RX ADMIN — ATORVASTATIN CALCIUM 80 MG: 40 TABLET, FILM COATED ORAL at 00:54

## 2024-06-10 RX ADMIN — METOPROLOL TARTRATE 50 MG: 50 TABLET, FILM COATED ORAL at 20:53

## 2024-06-10 RX ADMIN — APIXABAN 5 MG: 5 TABLET, FILM COATED ORAL at 20:52

## 2024-06-10 RX ADMIN — APIXABAN 5 MG: 5 TABLET, FILM COATED ORAL at 10:10

## 2024-06-10 RX ADMIN — GABAPENTIN 800 MG: 400 CAPSULE ORAL at 20:53

## 2024-06-10 RX ADMIN — FAMOTIDINE 20 MG: 20 TABLET, FILM COATED ORAL at 08:42

## 2024-06-10 NOTE — PROGRESS NOTES
"    [facility] HOSPITALIST PROGRESS NOTE     Patient Identification:  Name:  Madelyn Velazquez  Age:  48 y.o.  Sex:  female  :  1975  MRN:  [id number]  Visit Number:  [id number]  ROOM: [address] [place] [address]     Primary Care Provider:  Mariusz Bailey PA    Length of stay in inpatient status:  0    Subjective     Chief Compliant:    Chief Complaint   Patient presents with    Chest Pain    Irregular Heart Beat     History of Presenting Illness:    Patient remains ill today, no acute events overnight, no new complaints, denies any fevers or chills, still shortness of breath, checked RedsVest yesterday and was elevated > 40%, gave dose of IV lasix and patient reports she went to the bathroom \"all night,\" though I/O's not kept and discussed need for tracking urine output with patient and nurse at bedside, redose lasix today, Cardiology consulted and formal recommendations pending, remains NPO for any stress testing, formal echocardiogram also ordered and pending, creatinine mildly improved at 1.08, heart rate has also been improved following restarting home Tikosyn.   Objective     Current Hospital Meds:  allopurinol, 300 mg, Oral, Daily  apixaban, 5 mg, Oral, Q12H  aspirin, 81 mg, Oral, Daily  atorvastatin, 80 mg, Oral, Nightly  [START ON 6/15/2024] cholecalciferol, 50,000 Units, Oral, Weekly  dofetilide, 500 mcg, Oral, Q12H  famotidine, 20 mg, Oral, BID  gabapentin, 800 mg, Oral, Q8H  insulin lispro, 2-7 Units, Subcutaneous, 4x Daily PC & at Bedtime  metoprolol tartrate, 25 mg, Oral, Q12H  multivitamin with minerals, 1 tablet, Oral, QAM  pantoprazole, 40 mg, Oral, Q AM  sodium chloride, 10 mL, Intravenous, Q12H         ----------------------------------------------------------------------------------------------------------------------  Vital Signs:  Temp:  [97.8 °F (36.6 °C)-98.4 °F (36.9 °C)] 98.1 °F (36.7 °C)  Heart Rate:  [] 97  Resp:  [16-18] 18  BP: ()/() 129/89  SpO2:  [94 %-100 %] 96 " "%  on   ;   Device (Oxygen Therapy): room air  Body mass index is 40.6 kg/m².      Intake/Output Summary (Last 24 hours) at 6/10/2024 1012  Last data filed at 6/10/2024 0947  Gross per 24 hour   Intake 0 ml   Output 750 ml   Net -750 ml      ----------------------------------------------------------------------------------------------------------------------  Physical exam:  Constitutional:  Well-developed and well-nourished.  Improved acute distress.  HENT:  Head:  Normocephalic and atraumatic.  Mouth:  Moist mucous membranes.    Eyes:  Conjunctivae and EOM are normal. No scleral icterus.    Neck:  Neck supple.  No JVD present.    Cardiovascular:  borderline tachycardic rate, irregular rhythm and normal heart sounds with no murmur.  Pulmonary/Chest:  No respiratory distress, no wheezes, faint crackles, on room air  Abdominal:  Soft. No distension and no tenderness.   Musculoskeletal:  No tenderness, and no deformity.  No red or swollen joints anywhere.    Neurological:  Alert and oriented to person, place, and time.  No cranial nerve deficit.    Skin:  Skin is warm and dry. No rash noted. No pallor.   Peripheral vascular:  No clubbing, no cyanosis, trace-1_ edema.  Psychiatric: Appropriate mood and affect  Edited by: Ken Erickson MD at 6/10/2024 1011  ----------------------------------------------------------------------------------------------------------------------  WBC/HGB/HCT/PLT   12.36/11.5/38.0/274 (06/10 0011)  BUN/CREAT/GLUC/ALT/AST/WHIT/LIP    19/1.08/130/21/18/--/-- (06/10 0011)  LYTES - Na/K/Cl/CO2: 141/4.2/103/25.3 (06/10 0011)        No results found for: \"URINECX\"  No results found for: \"BLOODCX\"    I have personally looked at the labs and they are summarized above.  ----------------------------------------------------------------------------------------------------------------------  Detailed radiology reports for the last 24 hours:  XR Chest 1 View    Result Date: 6/9/2024  No radiographic " evidence of acute cardiac or pulmonary disease.    This report was finalized on 6/9/2024 12:27 PM by Dr. Lonnie Garcia MD.     Assessment & Plan    48F Morbid Obesity by BMI PMH Gout, GERD, Diabetes Mellitus Type II, Hypertension, Paroxysmal Atrial Fibrillation on chronic anticoagulation, History Cerebrovascular Accident, presented to [facility] emergency room 6/9 with complaints of chest discomfort after converting into Atrial Fibrillation at home, worse with exertion.    #Atrial Fibrillation with Rapid Ventricular Rate, on chronic anticoagulation   #Hypertension, now with Elevated HS Troponins and chest discomfort, suspect NSTEMI Type II due to tachycardia   #Elevated proBNP, suspect Mild HFpEF Exacerbation   - Labs showed HS Troponins 15->14, proBNP 4000 (last 5000)  - EKG showed Atrial Fibrillation with Rapid Ventricular Rate, ST/T wave abnormality inferolateral leads  - Cardiology consulted; Recommendations pending  - Continue home oral beta blocker, Tikosyn, oral Eliquis  - Status post IV lasix yesterday when RedsVest elevated, redosing today, trend creatinine and urine output, discussed strict I/O's with patient and bedside Nurse.   - Formal echocardiogram ordered and pending, follow up Cardiology recommendations on indications for inpatient stress testing.   - Continue to monitor on telemetry, strict I/O's, trend heart rate and blood pressure     #Mild Renal Insufficiency on Chronic Kidney Disease stage II  - Baseline creatinine 0.8-1.0, was 1.11 on admission; Today 1.08  - Trend creatinine and urine output, avoid nephrotoxins, NSAIDs, dehydration and contrast as able.    #Non-Insulin Dependent Diabetes Mellitus Type II, uncontrolled/unknown control, unknown complications  - Holding home oral agents, continue FSBG and SSI, add long acting as indicated.    #History Cerebrovascular Accident   - Continue home medications, supportive Care    #Gastroesophageal Reflux Disease  - Continue home PPI/H2  Blocker    #Gout without Acute Flair  - Continue home Allopurinol    #Morbid Obesity by BMI  - Body mass index is 44.11 kg/m².; complicates all aspects of care    F: NPO for now  E: Monitor & Replace as needed   N: NPO Diet NPO Type: Sips with Meds  PPx: Eliquis  Code Status (Patient has no pulse and is not breathing): CPR  Medical Interventions (Patient has pulse or is breathing): Full Support     Dispo: Pending workup and clinical improvement    *This patient is considered high risk secondary to Atrial Fibrillation with Rapid Ventricular Rate, chest discomfort/palpitations, elevated HS Troponins requiring further cardiac workup with imaging.   Edited by: Ken Erickson MD at 6/10/2024 1012  [facility] Hospitalist

## 2024-06-10 NOTE — CONSULTS
"Inpatient Cardiology Consult  Consult performed by: Joann Scott APRN  Consult ordered by: Ken Erickson MD        Date of Admit: 6/9/2024  Date of Consult: 06/10/24  Provider, No Known  Madelyn Velazquez  1975    Consulting Physician: Fabián Willams MD    Cardiology consultation    Reason for consultation: Chest pressure, elevated high-sensitivity troponin, A-fib      History of Present Illness    Subjective     Chief Complaint   Patient presents with    Chest Pain    Irregular Heart Beat       Madelyn Velazquez is a 48 y.o. female with hypertension, atrial fibrillation, hyperlipidemia, history of CVA, type 2 diabetes, migraine headaches, osteoarthritis, and obesity.  She initially presented to the ED with complaints of chest pain and irregular heartbeats.  She reported that chest discomfort increased with exertion.  Upon arrival to the ED her heart rate was initially 109, high-sensitivity troponins were 15 and trended to 14.  EKG showed some ST/T wave abnormality in the inferior lateral leads.  Patient was admitted for further evaluation and management.  Cardiology was consulted for chest pressure, elevated high-sensitivity troponin and atrial fibrillation.    Telemetry reviewed.  Patient currently in atrial fibrillation in the 80s with elevations up to the 120s.    The patient was seen and examined.  Family is at bedside.  Ms. Velazquez states that she was experiencing irregular heartbeats and also chest pressure when she came to the ED.  She reports the chest pressure is like someone \"sitting on my chest\".  She also reports associated stabbing pains.  She states that these pains lasted all day yesterday.  She reports that she can tell when she is in atrial fibrillation because she gets tired easily.  And she knew yesterday that she was in A-fib.    Cardiac risk factors:diabetes mellitus, hypercholesterolemia, hypertension, and Obesity    Last Echo: 12/31/2018      Clinical Indication    Other Reasons - " Stroke; Lightheadedness, Presyncope, or Syncope; Syncope - Stroke   Comments: New onset afib     Interpretation Summary    Left ventricular systolic function is normal.  Left atrial cavity size is borderline dilated.    Last Stress: N/A    Last Cath: N/A      Past Medical History:   Diagnosis Date    A-fib     Arrhythmia     ATRIAL FIB    Back pain     CTS (carpal tunnel syndrome)     Diabetes mellitus     Elevated cholesterol     GERD (gastroesophageal reflux disease)     Gout     Hypertension     Migraine     Osteoarthritis     Stroke     DEC 30 2018     Past Surgical History:   Procedure Laterality Date    CARPAL TUNNEL RELEASE Left     CHOLECYSTECTOMY      COLONOSCOPY      COLONOSCOPY N/A 11/15/2022    Procedure: COLONOSCOPY;  Surgeon: Kim Estes MD;  Location: [facility] OR;  Service: Gastroenterology;  Laterality: N/A;    ENDOSCOPY      ENDOSCOPY N/A 11/15/2022    Procedure: ESOPHAGOGASTRODUODENOSCOPY;  Surgeon: Kim Estes MD;  Location: [facility] OR;  Service: Gastroenterology;  Laterality: N/A;    HAND SURGERY Left     LEG SURGERY Left     OVARIAN CYST SURGERY       Family History   Problem Relation Age of Onset    Osteoarthritis Mother     Heart disease Mother     Hypertension Mother     Osteoarthritis Father     Hypertension Father     Osteoarthritis Sister     Rheum arthritis Sister     Heart disease Sister     Hypertension Sister     Migraines Sister     Osteoarthritis Brother     Heart disease Brother     Hypertension Brother     Osteoarthritis Maternal Grandmother     Heart disease Maternal Grandmother     Hypertension Maternal Grandmother     Osteoarthritis Maternal Grandfather     Heart disease Maternal Grandfather     Hypertension Maternal Grandfather     Breast cancer Neg Hx      Social History     Tobacco Use    Smoking status: Never    Smokeless tobacco: Never   Vaping Use    Vaping status: Never Used   Substance Use Topics    Alcohol use: No    Drug use: No      Medications Prior to Admission   Medication Sig Dispense Refill Last Dose    allopurinol (ZYLOPRIM) 300 MG tablet Take 1 tablet by mouth Daily.   6/9/2024    apixaban (ELIQUIS) 5 MG tablet tablet Take 1 tablet by mouth Every 12 (Twelve) Hours. 60 tablet 1 6/9/2024    aspirin 81 MG EC tablet Take 1 tablet by mouth Every Morning.   6/9/2024    atorvastatin (LIPITOR) 80 MG tablet Take 1 tablet by mouth Every Night. 90 tablet 2 6/8/2024    clonazePAM (KlonoPIN) 0.5 MG tablet Take 1 tablet by mouth 2 (Two) Times a Day As Needed for Anxiety.   6/9/2024    Diclofenac Sodium (VOLTAREN) 1 % gel gel Apply 4 g topically to the appropriate area as directed At Night As Needed (foot pain). Applies to both feet at bedtime   6/8/2024    dofetilide (TIKOSYN) 500 MCG capsule Take 1 capsule by mouth Every 12 (Twelve) Hours. 60 capsule 11 6/8/2024    famotidine (PEPCID) 20 MG tablet Take 1 tablet by mouth 2 (Two) Times a Day.   6/9/2024    gabapentin (NEURONTIN) 800 MG tablet Take 1 tablet by mouth 3 (Three) Times a Day.   6/9/2024    glipizide (GLUCOTROL) 10 MG tablet Take 1 tablet by mouth Daily.   6/9/2024    metoprolol tartrate (LOPRESSOR) 50 MG tablet Take 1 tablet by mouth 2 (Two) Times a Day.   6/9/2024    multivitamin with minerals (I-francheska) tablet tablet Take 1 tablet by mouth Every Morning.   6/9/2024    omeprazole (priLOSEC) 40 MG capsule Take 1 capsule by mouth Every Night.   6/8/2024    oxyCODONE-acetaminophen (PERCOCET)  MG per tablet Take 1 tablet by mouth Every 8 (Eight) Hours.   6/9/2024    SITagliptin (JANUVIA) 100 MG tablet Take 1 tablet by mouth Daily.   6/9/2024    tiZANidine (ZANAFLEX) 4 MG tablet Take 1 tablet by mouth Every 8 (Eight) Hours As Needed for Muscle Spasms (and pain).   6/9/2024    vitamin D (ERGOCALCIFEROL) 1.25 MG (85984 UT) capsule capsule Take 1 capsule by mouth 1 (One) Time Per Week. Saturdays 6/8/2024    acetaminophen (TYLENOL) 650 MG 8 hr tablet Take 1 tablet by mouth Daily As Needed  for Mild Pain or Moderate Pain.   Unknown    Semaglutide, 2 MG/DOSE, (Ozempic, 2 MG/DOSE,) 8 MG/3ML solution pen-injector Inject 2 mg under the skin into the appropriate area as directed 1 (One) Time Per Week. On Tuesdays 6/4/2024     Allergies:  Colchicine, Ultram [tramadol hcl], Cefdinir, Doxycycline, Indomethacin, Latex, Metformin, and Tape    Review of Systems   Constitutional:  Positive for fatigue.   Respiratory:  Negative for shortness of breath.    Cardiovascular:  Positive for chest pain and palpitations. Negative for leg swelling.   Gastrointestinal:  Negative for blood in stool.   Neurological:  Negative for dizziness, syncope, weakness and light-headedness.   Hematological:  Does not bruise/bleed easily.         Objective      Vital Signs  Temp:  [97.8 °F (36.6 °C)-98.4 °F (36.9 °C)] 98.1 °F (36.7 °C)  Heart Rate:  [] 97  Resp:  [16-18] 18  BP: ()/() 129/89  Body mass index is 40.6 kg/m².    Intake/Output Summary (Last 24 hours) at 6/10/2024 0931  Last data filed at 6/10/2024 0000  Gross per 24 hour   Intake 0 ml   Output --   Net 0 ml       Physical Exam  Vitals reviewed.   Constitutional:       Appearance: Normal appearance. She is well-developed.   Cardiovascular:      Rate and Rhythm: Normal rate. Rhythm irregular.      Heart sounds: No murmur heard.     No friction rub. No gallop.   Pulmonary:      Effort: Pulmonary effort is normal. No respiratory distress.      Breath sounds: Normal breath sounds. No wheezing or rales.   Skin:     General: Skin is warm and dry.   Neurological:      Mental Status: She is alert and oriented to person, place, and time.   Psychiatric:         Mood and Affect: Mood normal.         Behavior: Behavior normal.         Telemetry: A-fib 80s    Results Review:   I reviewed the patient's new clinical results.  Results from last 7 days   Lab Units 06/10/24  0024 06/09/24  1505 06/09/24  1257   HSTROP T ng/L 12 14* 15*     Results from last 7 days   Lab  "Units 06/10/24  0011 06/09/24  1257   WBC 10*3/mm3 12.36* 9.16   HEMOGLOBIN g/dL 11.5* 11.1*   PLATELETS 10*3/mm3 274 262     Results from last 7 days   Lab Units 06/10/24  0011 06/09/24  1257   SODIUM mmol/L 141 136   POTASSIUM mmol/L 4.2 4.2   CHLORIDE mmol/L 103 104   CO2 mmol/L 25.3 23.1   BUN mg/dL 19 22*   CREATININE mg/dL 1.08* 1.11*   CALCIUM mg/dL 9.6 9.2   GLUCOSE mg/dL 130* 157*   ALT (SGPT) U/L 21 20   AST (SGOT) U/L 18 18     Lab Results   Component Value Date    INR 1.29 (H) 05/02/2019     Lab Results   Component Value Date    MG 2.4 01/31/2020    MG 1.9 01/30/2020    MG 2.4 01/29/2020     Lab Results   Component Value Date    TSH 1.310 05/02/2019    TRIG 168 (H) 12/31/2018    HDL 40 12/31/2018    LDL 75 12/31/2018      No results found for: \"BNP\"     EKG:         Imaging Results (Last 72 Hours)       Procedure Component Value Units Date/Time    XR Chest 1 View [[id number]] Collected: 06/09/24 1227     Updated: 06/09/24 1232    Narrative:      XR CHEST 1 VW-     CLINICAL INDICATION: Chest Pain Triage Protocol        COMPARISON: 5/2/2019     TECHNIQUE: Single frontal view of the chest.     FINDINGS:     LUNGS: Lungs are adequately aerated.      HEART AND MEDIASTINUM: Heart and mediastinal contours are unremarkable        SKELETON: Bony and soft tissue structures are unremarkable.             Impression:      No radiographic evidence of acute cardiac or pulmonary disease.           This report was finalized on 6/9/2024 12:27 PM by Dr. Lonnie Garcia MD.                Assessment:  Atrial fibrillation with RVR, chronically anticoagulated on Eliquis.  Rhythm control with Tikosyn.  Chest pain, typical and atypical features in the setting of atrial fibrillation with RVR and in a patient with multiple risk factors for coronary artery disease.  Hypertension  Dyslipidemia  History of CVA.      Recommendations:  Patient continues in atrial fibrillation, continue Tikosyn and metoprolol.  IV dig added this " afternoon  Due to patient's report of chest pain we have ordered a stress test and an echocardiogram.  Unfortunately, when the patient went down for her stress test earlier today her heart rate was elevated and stress has been postponed until tomorrow.  IV digoxin has been given to help to slowed her heart rate.  Continue aspirin, atorvastatin and metoprolol.  Continue metoprolol for hypertension, increased to 50 mg this evening      Thank you very much for asking us to be involved in this patient's care.  We will follow along with you.    Patient discussed with Dr. Willams.  Together we developed a treatment plan for this patient.    Electronically signed by TRUDI Harp, 06/10/24, 6:46 PM EDT.

## 2024-06-10 NOTE — PLAN OF CARE
Goal Outcome Evaluation:   Pt sitting on edge of bed with family at bedside. No acute s/s of distress noted or verbalized. VSS. Will continue to follow plan of care.      Pain in the right knee with swelling and stiffness. Gradually worse over the past 2 to 3 months. Unsure if this is related to the ankle pain and weightbearing mechanics changes.   She will need further evaluation as her knee has been locking and she has h

## 2024-06-10 NOTE — PLAN OF CARE
Patient resting in the bed throughout the night. No s/s of distress noted. Complaints of chest pain 10/10, CP protocol followed see results. No other complaints. Will continue to follow plan of care.

## 2024-06-11 VITALS
OXYGEN SATURATION: 98 % | BODY MASS INDEX: 41.67 KG/M2 | WEIGHT: 244.05 LBS | SYSTOLIC BLOOD PRESSURE: 142 MMHG | DIASTOLIC BLOOD PRESSURE: 88 MMHG | RESPIRATION RATE: 18 BRPM | HEART RATE: 83 BPM | TEMPERATURE: 98 F | HEIGHT: 64 IN

## 2024-06-11 PROBLEM — R07.89 CHEST PRESSURE: Status: RESOLVED | Noted: 2024-06-09 | Resolved: 2024-06-11

## 2024-06-11 LAB
ALBUMIN SERPL-MCNC: 3.7 G/DL (ref 3.5–5.2)
ALBUMIN/GLOB SERPL: 1.2 G/DL
ALP SERPL-CCNC: 109 U/L (ref 39–117)
ALT SERPL W P-5'-P-CCNC: 30 U/L (ref 1–33)
ANION GAP SERPL CALCULATED.3IONS-SCNC: 12.5 MMOL/L (ref 5–15)
AST SERPL-CCNC: 33 U/L (ref 1–32)
BH CV ECHO MEAS - AO MAX PG: 3.3 MMHG
BH CV ECHO MEAS - AO MEAN PG: 2 MMHG
BH CV ECHO MEAS - AO ROOT DIAM: 2.7 CM
BH CV ECHO MEAS - AO V2 MAX: 90.5 CM/SEC
BH CV ECHO MEAS - AO V2 VTI: 18.1 CM
BH CV ECHO MEAS - EDV(CUBED): 80.6 ML
BH CV ECHO MEAS - EDV(MOD-SP4): 33.4 ML
BH CV ECHO MEAS - EF(MOD-SP4): 67.4 %
BH CV ECHO MEAS - ESV(CUBED): 45.1 ML
BH CV ECHO MEAS - ESV(MOD-SP4): 10.9 ML
BH CV ECHO MEAS - FS: 17.6 %
BH CV ECHO MEAS - IVS/LVPW: 1.25 CM
BH CV ECHO MEAS - IVSD: 1.28 CM
BH CV ECHO MEAS - LA DIMENSION: 3.4 CM
BH CV ECHO MEAS - LAT PEAK E' VEL: 6.6 CM/SEC
BH CV ECHO MEAS - LV DIASTOLIC VOL/BSA (35-75): 15.9 CM2
BH CV ECHO MEAS - LV MASS(C)D: 174.9 GRAMS
BH CV ECHO MEAS - LV SYSTOLIC VOL/BSA (12-30): 5.2 CM2
BH CV ECHO MEAS - LVIDD: 4.3 CM
BH CV ECHO MEAS - LVIDS: 3.6 CM
BH CV ECHO MEAS - LVOT AREA: 2.5 CM2
BH CV ECHO MEAS - LVOT DIAM: 1.8 CM
BH CV ECHO MEAS - LVPWD: 1.02 CM
BH CV ECHO MEAS - MED PEAK E' VEL: 5 CM/SEC
BH CV ECHO MEAS - MV A MAX VEL: 41 CM/SEC
BH CV ECHO MEAS - MV E MAX VEL: 48.4 CM/SEC
BH CV ECHO MEAS - MV E/A: 1.18
BH CV ECHO MEAS - PA ACC TIME: 0.08 SEC
BH CV ECHO MEAS - SV(MOD-SP4): 22.5 ML
BH CV ECHO MEAS - SVI(MOD-SP4): 10.7 ML/M2
BH CV ECHO MEASUREMENTS AVERAGE E/E' RATIO: 8.34
BH CV NUCLEAR PRIOR STUDY: 3
BH CV REST NUCLEAR ISOTOPE DOSE: 8.8 MCI
BH CV STRESS BP STAGE 1: NORMAL
BH CV STRESS COMMENTS STAGE 1: NORMAL
BH CV STRESS DOSE REGADENOSON STAGE 1: 0.4
BH CV STRESS DURATION MIN STAGE 1: 0
BH CV STRESS DURATION SEC STAGE 1: 10
BH CV STRESS HR STAGE 1: 106
BH CV STRESS NUCLEAR ISOTOPE DOSE: 32.1 MCI
BH CV STRESS PROTOCOL 1: NORMAL
BH CV STRESS RECOVERY BP: NORMAL MMHG
BH CV STRESS RECOVERY HR: 96 BPM
BH CV STRESS STAGE 1: 1
BILIRUB SERPL-MCNC: 0.2 MG/DL (ref 0–1.2)
BUN SERPL-MCNC: 22 MG/DL (ref 6–20)
BUN/CREAT SERPL: 18.8 (ref 7–25)
CALCIUM SPEC-SCNC: 9.4 MG/DL (ref 8.6–10.5)
CHLORIDE SERPL-SCNC: 102 MMOL/L (ref 98–107)
CHOLEST SERPL-MCNC: 108 MG/DL (ref 0–200)
CO2 SERPL-SCNC: 24.5 MMOL/L (ref 22–29)
CREAT SERPL-MCNC: 1.17 MG/DL (ref 0.57–1)
DEPRECATED RDW RBC AUTO: 51 FL (ref 37–54)
EGFRCR SERPLBLD CKD-EPI 2021: 57.7 ML/MIN/1.73
ERYTHROCYTE [DISTWIDTH] IN BLOOD BY AUTOMATED COUNT: 15.9 % (ref 12.3–15.4)
GEN 5 2HR TROPONIN T REFLEX: 12 NG/L
GLOBULIN UR ELPH-MCNC: 3.2 GM/DL
GLUCOSE BLDC GLUCOMTR-MCNC: 110 MG/DL (ref 70–130)
GLUCOSE BLDC GLUCOMTR-MCNC: 153 MG/DL (ref 70–130)
GLUCOSE BLDC GLUCOMTR-MCNC: 156 MG/DL (ref 70–130)
GLUCOSE SERPL-MCNC: 197 MG/DL (ref 65–99)
HCT VFR BLD AUTO: 38.3 % (ref 34–46.6)
HDLC SERPL-MCNC: 45 MG/DL (ref 40–60)
HGB BLD-MCNC: 11.5 G/DL (ref 12–15.9)
LDLC SERPL CALC-MCNC: 26 MG/DL (ref 0–100)
LDLC/HDLC SERPL: 0.32 {RATIO}
LEFT ATRIUM VOLUME INDEX: 22.7 ML/M2
LV EF NUC BP: 65 %
MAXIMAL PREDICTED HEART RATE: 172 BPM
MCH RBC QN AUTO: 26.4 PG (ref 26.6–33)
MCHC RBC AUTO-ENTMCNC: 30 G/DL (ref 31.5–35.7)
MCV RBC AUTO: 87.8 FL (ref 79–97)
PERCENT MAX PREDICTED HR: 61.63 %
PLATELET # BLD AUTO: 271 10*3/MM3 (ref 140–450)
PMV BLD AUTO: 11.7 FL (ref 6–12)
POTASSIUM SERPL-SCNC: 4.3 MMOL/L (ref 3.5–5.2)
PROT SERPL-MCNC: 6.9 G/DL (ref 6–8.5)
QT INTERVAL: 354 MS
QTC INTERVAL: 485 MS
RBC # BLD AUTO: 4.36 10*6/MM3 (ref 3.77–5.28)
SODIUM SERPL-SCNC: 139 MMOL/L (ref 136–145)
STRESS BASELINE BP: NORMAL MMHG
STRESS BASELINE HR: 88 BPM
STRESS PERCENT HR: 73 %
STRESS POST PEAK BP: NORMAL MMHG
STRESS POST PEAK HR: 106 BPM
STRESS TARGET HR: 146 BPM
TRIGL SERPL-MCNC: 244 MG/DL (ref 0–150)
TROPONIN T DELTA: -1 NG/L
VLDLC SERPL-MCNC: 37 MG/DL (ref 5–40)
WBC NRBC COR # BLD AUTO: 9.75 10*3/MM3 (ref 3.4–10.8)

## 2024-06-11 PROCEDURE — 82948 REAGENT STRIP/BLOOD GLUCOSE: CPT

## 2024-06-11 PROCEDURE — 80053 COMPREHEN METABOLIC PANEL: CPT | Performed by: INTERNAL MEDICINE

## 2024-06-11 PROCEDURE — 99239 HOSP IP/OBS DSCHRG MGMT >30: CPT | Performed by: INTERNAL MEDICINE

## 2024-06-11 PROCEDURE — 36415 COLL VENOUS BLD VENIPUNCTURE: CPT | Performed by: INTERNAL MEDICINE

## 2024-06-11 PROCEDURE — A9500 TC99M SESTAMIBI: HCPCS | Performed by: INTERNAL MEDICINE

## 2024-06-11 PROCEDURE — 25010000002 DIGOXIN PER 500 MCG: Performed by: NURSE PRACTITIONER

## 2024-06-11 PROCEDURE — 85027 COMPLETE CBC AUTOMATED: CPT | Performed by: INTERNAL MEDICINE

## 2024-06-11 PROCEDURE — 96376 TX/PRO/DX INJ SAME DRUG ADON: CPT

## 2024-06-11 PROCEDURE — G0378 HOSPITAL OBSERVATION PER HR: HCPCS

## 2024-06-11 PROCEDURE — 0 TECHNETIUM SESTAMIBI: Performed by: INTERNAL MEDICINE

## 2024-06-11 PROCEDURE — 25010000002 REGADENOSON 0.4 MG/5ML SOLUTION: Performed by: INTERNAL MEDICINE

## 2024-06-11 PROCEDURE — 80061 LIPID PANEL: CPT | Performed by: NURSE PRACTITIONER

## 2024-06-11 PROCEDURE — 84484 ASSAY OF TROPONIN QUANT: CPT | Performed by: INTERNAL MEDICINE

## 2024-06-11 RX ORDER — DIGOXIN 0.25 MG/ML
125 INJECTION INTRAMUSCULAR; INTRAVENOUS ONCE
Status: COMPLETED | OUTPATIENT
Start: 2024-06-11 | End: 2024-06-11

## 2024-06-11 RX ORDER — REGADENOSON 0.08 MG/ML
0.4 INJECTION, SOLUTION INTRAVENOUS
Status: COMPLETED | OUTPATIENT
Start: 2024-06-11 | End: 2024-06-11

## 2024-06-11 RX ADMIN — APIXABAN 5 MG: 5 TABLET, FILM COATED ORAL at 20:24

## 2024-06-11 RX ADMIN — Medication 10 ML: at 20:25

## 2024-06-11 RX ADMIN — OXYCODONE HYDROCHLORIDE AND ACETAMINOPHEN 1 TABLET: 10; 325 TABLET ORAL at 08:28

## 2024-06-11 RX ADMIN — Medication 10 ML: at 08:23

## 2024-06-11 RX ADMIN — DOFETILIDE 500 MCG: 0.25 CAPSULE ORAL at 08:23

## 2024-06-11 RX ADMIN — METOPROLOL TARTRATE 50 MG: 50 TABLET, FILM COATED ORAL at 20:24

## 2024-06-11 RX ADMIN — NITROGLYCERIN 1 INCH: 20 OINTMENT TOPICAL at 06:34

## 2024-06-11 RX ADMIN — DIGOXIN 250 MCG: 250 INJECTION, SOLUTION INTRAMUSCULAR; INTRAVENOUS; PARENTERAL at 06:25

## 2024-06-11 RX ADMIN — GABAPENTIN 800 MG: 400 CAPSULE ORAL at 20:24

## 2024-06-11 RX ADMIN — DOFETILIDE 500 MCG: 0.25 CAPSULE ORAL at 20:24

## 2024-06-11 RX ADMIN — APIXABAN 5 MG: 5 TABLET, FILM COATED ORAL at 08:23

## 2024-06-11 RX ADMIN — OXYCODONE HYDROCHLORIDE AND ACETAMINOPHEN 1 TABLET: 10; 325 TABLET ORAL at 17:19

## 2024-06-11 RX ADMIN — PANTOPRAZOLE SODIUM 40 MG: 40 TABLET, DELAYED RELEASE ORAL at 06:27

## 2024-06-11 RX ADMIN — ATORVASTATIN CALCIUM 80 MG: 40 TABLET, FILM COATED ORAL at 20:24

## 2024-06-11 RX ADMIN — OXYCODONE HYDROCHLORIDE AND ACETAMINOPHEN 1 TABLET: 10; 325 TABLET ORAL at 01:00

## 2024-06-11 RX ADMIN — TECHNETIUM TC 99M SESTAMIBI 1 DOSE: 1 INJECTION INTRAVENOUS at 08:52

## 2024-06-11 RX ADMIN — GABAPENTIN 800 MG: 400 CAPSULE ORAL at 13:18

## 2024-06-11 RX ADMIN — FAMOTIDINE 20 MG: 20 TABLET, FILM COATED ORAL at 20:24

## 2024-06-11 RX ADMIN — METOPROLOL TARTRATE 50 MG: 50 TABLET, FILM COATED ORAL at 08:23

## 2024-06-11 RX ADMIN — DIGOXIN 250 MCG: 250 INJECTION, SOLUTION INTRAMUSCULAR; INTRAVENOUS; PARENTERAL at 00:59

## 2024-06-11 RX ADMIN — REGADENOSON 0.4 MG: 0.08 INJECTION, SOLUTION INTRAVENOUS at 08:52

## 2024-06-11 RX ADMIN — ALLOPURINOL 300 MG: 300 TABLET ORAL at 08:23

## 2024-06-11 RX ADMIN — FAMOTIDINE 20 MG: 20 TABLET, FILM COATED ORAL at 08:23

## 2024-06-11 RX ADMIN — ASPIRIN 81 MG: 81 TABLET, COATED ORAL at 08:23

## 2024-06-11 RX ADMIN — Medication 1 TABLET: at 08:23

## 2024-06-11 RX ADMIN — DIGOXIN 125 MCG: 0.25 INJECTION INTRAMUSCULAR; INTRAVENOUS at 11:34

## 2024-06-11 NOTE — PLAN OF CARE
Goal Outcome Evaluation:   Pt resting in bed with spouse at bedside. No signs or symptoms of distress noted. No complaints at this time. Plan of care on going.

## 2024-06-11 NOTE — DISCHARGE SUMMARY
[facility] HOSPITALIST MEDICINE DISCHARGE SUMMARY    Patient Identification:  Name:  Madelyn Velazquez  Age:  48 y.o.  Sex:  female  :  1975  MRN:  [id number]  Visit Number:  [id number]    Date of Admission: 2024  Date of Discharge: 2024    PCP: Mariusz Bailey PA    DISCHARGE DIAGNOSIS   A-fib with RVR  Essential hypertension  Non-insulin-dependent type 2 diabetes mellitus  GERD  Morbid obesity      CONSULTS  Dr. Willams, Cardiology      PROCEDURES PERFORMED   None      HOSPITAL COURSE  Ms. Velazquez is a 48 y.o. female who presented to [facility] ED on 2040 with a chief complaint of chest pain.  Patient has a past medical history markable for GERD, type 2 diabetes mellitus, essential hypertension, paroxysmal A-fib and history of CVA.  Patient reported a 1 day history of chest discomfort after converting to atrial fibrillation at home.  Secondary to chest pain, she did present to the emergency department for further treatment and evaluation.  Initial evaluation in the emergency department did consist of basic laboratory work as well as physical exam and vital signs.  Please see initial H&P for specific details.  Initial EKG did demonstrate A-fib with RVR.  Patient was given oral beta-blocker with IV morphine in the emergency department.  Patient was found to have heart rates ranging in the 110-120 range and was admitted for further treatment and evaluation.    Patient was continued on oral beta-blocker with Eliquis and cardiology services were consulted.  After thorough evaluation by cardiology services, recommendation was made to obtain stress test and transthoracic echo.  Transthoracic echo obtained 6/10/2024 demonstrates normal left ventricular systolic function with estimated EF 55 to 60%.  Stress test obtained 2024 demonstrates normal myocardial perfusion study with no evidence of ischemia.  With this in mind, cardiology made no further recommendations on  medication changes or any other interventions at this time.  Recommendation was made for patient to follow-up with primary cardiologist in the outpatient setting for further evaluation and consideration of changing rhythm/rate controlling medications.  With that in mind, it is felt patient has reached maximum medical benefit of current hospitalization and will be discharged home in stable condition today.  The beforementioned plan was thoroughly discussed with the patient and she expressed understanding and willingness to proceed with the beforementioned plan.    VITAL SIGNS:      06/09/24  1926 06/10/24  0500 06/11/24  0527   Weight: 107 kg (236 lb 8.9 oz) 107 kg (236 lb 8.9 oz) 111 kg (244 lb 0.8 oz)     Body mass index is 41.89 kg/m².    PHYSICAL EXAM:  General -well-nourished  female appearing stated age in no apparent distress  HEENT -head normocephalic and atraumatic, pupils equally round and reactive to light  Lungs -clear to auscultation bilaterally with no wheezes, rales or rhonchi appreciated  Cardiovascular -irregular rhythm with rate in the 80s with no murmurs, rubs or clicks appreciated  GI -abdomen soft, nontender nondistended  Neurological -cranial nerves II through XII intact with no focal deficit or unintentional motor movement appreciated    DISCHARGE DISPOSITION   Stable    DISCHARGE MEDICATIONS:     Discharge Medications        Continue These Medications        Instructions Start Date   acetaminophen 650 MG 8 hr tablet  Commonly known as: TYLENOL   650 mg, Oral, Daily PRN      allopurinol 300 MG tablet  Commonly known as: ZYLOPRIM   300 mg, Oral, Daily      apixaban 5 MG tablet tablet  Commonly known as: ELIQUIS   5 mg, Oral, Every 12 Hours Scheduled      aspirin 81 MG EC tablet   81 mg, Oral, Every Morning      atorvastatin 80 MG tablet  Commonly known as: LIPITOR   80 mg, Oral, Nightly      clonazePAM 0.5 MG tablet  Commonly known as: KlonoPIN   0.5 mg, Oral, 2 Times Daily PRN       Diclofenac Sodium 1 % gel gel  Commonly known as: VOLTAREN   4 g, Topical, Nightly PRN, Applies to both feet at bedtime      dofetilide 500 MCG capsule  Commonly known as: TIKOSYN   500 mcg, Oral, Every 12 Hours Scheduled      famotidine 20 MG tablet  Commonly known as: PEPCID   20 mg, Oral, 2 Times Daily      gabapentin 800 MG tablet  Commonly known as: NEURONTIN   800 mg, Oral, 3 Times Daily      glipizide 10 MG tablet  Commonly known as: GLUCOTROL   10 mg, Oral, Daily      I-francheska tablet tablet   1 tablet, Oral, Every Morning      metoprolol tartrate 50 MG tablet  Commonly known as: LOPRESSOR   50 mg, Oral, 2 Times Daily      omeprazole 40 MG capsule  Commonly known as: priLOSEC   40 mg, Oral, Nightly      oxyCODONE-acetaminophen  MG per tablet  Commonly known as: PERCOCET   1 tablet, Oral, Every 8 Hours      Ozempic (2 MG/DOSE) 8 MG/3ML solution pen-injector  Generic drug: Semaglutide (2 MG/DOSE)   2 mg, Subcutaneous, Weekly, On Tuesdays      SITagliptin 100 MG tablet  Commonly known as: JANUVIA   100 mg, Oral, Daily      tiZANidine 4 MG tablet  Commonly known as: ZANAFLEX   4 mg, Oral, Every 8 Hours PRN      vitamin D 1.25 MG (40185 UT) capsule capsule  Commonly known as: ERGOCALCIFEROL   50,000 Units, Oral, Weekly, Saturdays                     Additional Instructions for the Follow-ups that You Need to Schedule       Discharge Follow-up with PCP   As directed       Currently Documented PCP:    Mariusz Bailey PA    PCP Phone Number:    [phone]     Follow Up Details: please follow up with pcp in 1 week        Discharge Follow-up with Specialty: Cardiology; 2 Weeks   As directed      Specialty: Cardiology   Follow Up: 2 Weeks   Follow Up Details: hospital follow up for Afib with RVR               Follow-up Information       Mariusz Bailey PA .    Specialty: Physician Assistant  Why: please follow up with pcp in 1 week  Contact information:  [address] [place] [id number] [phone]                              TEST  RESULTS PENDING AT DISCHARGE       Bradley Godinez,   06/11/24  19:01 EDT    Please note that this discharge summary required more than 30 minutes to complete.    Please send a copy of this dictation to the following providers:  Mariusz Bailey, PA

## 2024-06-11 NOTE — PLAN OF CARE
Patient resting in the bed throughout the night. No s/s of distress noted. Complaints of pain, PRN meds given. Will continue to follow plan of care.

## 2024-06-12 LAB
QT INTERVAL: 334 MS
QT INTERVAL: 358 MS
QTC INTERVAL: 452 MS
QTC INTERVAL: 479 MS

## 2024-07-02 ENCOUNTER — HOSPITAL ENCOUNTER (OUTPATIENT)
Dept: CARDIOLOGY | Facility: HOSPITAL | Age: 49
Discharge: HOME | End: 2024-07-02
Attending: INTERNAL MEDICINE | Admitting: INTERNAL MEDICINE
Payer: MEDICARE

## 2024-07-02 VITALS
BODY MASS INDEX: 43.77 KG/M2 | RESPIRATION RATE: 18 BRPM | DIASTOLIC BLOOD PRESSURE: 53 MMHG | HEIGHT: 64 IN | HEART RATE: 67 BPM | OXYGEN SATURATION: 98 % | WEIGHT: 256.4 LBS | SYSTOLIC BLOOD PRESSURE: 112 MMHG | TEMPERATURE: 96.5 F

## 2024-07-02 DIAGNOSIS — I48.19 PERSISTENT ATRIAL FIBRILLATION: Primary | ICD-10-CM

## 2024-07-02 DIAGNOSIS — I48.11 LONGSTANDING PERSISTENT ATRIAL FIBRILLATION: ICD-10-CM

## 2024-07-02 LAB
QT INTERVAL: 464 MS
QTC INTERVAL: 493 MS

## 2024-07-02 PROCEDURE — 93005 ELECTROCARDIOGRAM TRACING: CPT | Performed by: INTERNAL MEDICINE

## 2024-07-02 PROCEDURE — 36415 COLL VENOUS BLD VENIPUNCTURE: CPT

## 2024-07-02 PROCEDURE — 92960 CARDIOVERSION ELECTRIC EXT: CPT

## 2024-08-08 PROBLEM — R47.01 EXPRESSIVE APHASIA: Status: RESOLVED | Noted: 2019-02-05 | Resolved: 2024-08-08

## 2024-08-08 PROBLEM — E66.01 CLASS 3 SEVERE OBESITY WITH BODY MASS INDEX (BMI) OF 40.0 TO 44.9 IN ADULT: Status: ACTIVE | Noted: 2024-08-08

## 2024-08-08 PROBLEM — M79.644 PAIN IN FINGER OF RIGHT HAND: Status: RESOLVED | Noted: 2018-09-17 | Resolved: 2024-08-08

## 2024-08-08 PROBLEM — I10 PRIMARY HYPERTENSION: Status: ACTIVE | Noted: 2024-08-08

## 2024-08-08 PROBLEM — I48.19 PERSISTENT ATRIAL FIBRILLATION: Status: ACTIVE | Noted: 2019-01-01

## 2024-08-08 PROBLEM — Z79.01 CHRONIC ANTICOAGULATION: Status: ACTIVE | Noted: 2024-08-08

## 2024-08-08 PROBLEM — R13.19 ESOPHAGEAL DYSPHAGIA: Status: RESOLVED | Noted: 2022-11-08 | Resolved: 2024-08-08

## 2024-08-08 PROBLEM — Z79.899 LONG TERM CURRENT USE OF ANTIARRHYTHMIC DRUG: Status: ACTIVE | Noted: 2024-08-08

## 2024-08-08 PROBLEM — R47.1 DYSARTHRIA: Status: RESOLVED | Noted: 2018-12-30 | Resolved: 2024-08-08

## (undated) DEVICE — CONN Y IRR DISP 1P/U

## (undated) DEVICE — FRCP BX RADJAW4 NDL 2.8 240CM LG OG BX40

## (undated) DEVICE — THE BITE BLOCK MAXI, LATEX FREE STRAP IS USED TO PROTECT THE ENDOSCOPE INSERTION TUBE FROM BEING BITTEN BY THE PATIENT.

## (undated) DEVICE — ENDOGATOR TUBING FOR ENDOGATOR EGP-100 IRRIGATION PUMP,OLYMPUS OFP PUMP, OLYMPUS AFU-100 PUMP AND ERBE EIP2 PUMP: Brand: ENDOGATOR

## (undated) DEVICE — SINGLE PORT MANIFOLD: Brand: NEPTUNE 2

## (undated) DEVICE — TUBING, SUCTION, 1/4" X 20', STRAIGHT: Brand: MEDLINE INDUSTRIES, INC.

## (undated) DEVICE — Device

## (undated) DEVICE — Device: Brand: DEFENDO AIR/WATER/SUCTION AND BIOPSY VALVE

## (undated) DEVICE — ENDOGATOR AUXILIARY WATER JET CONNECTOR: Brand: ENDOGATOR